# Patient Record
Sex: FEMALE | Race: WHITE | NOT HISPANIC OR LATINO | Employment: OTHER | URBAN - METROPOLITAN AREA
[De-identification: names, ages, dates, MRNs, and addresses within clinical notes are randomized per-mention and may not be internally consistent; named-entity substitution may affect disease eponyms.]

---

## 2017-03-03 ENCOUNTER — RADIANT APPOINTMENT (OUTPATIENT)
Dept: GENERAL RADIOLOGY | Facility: CLINIC | Age: 32
End: 2017-03-03
Attending: FAMILY MEDICINE
Payer: COMMERCIAL

## 2017-03-03 ENCOUNTER — OFFICE VISIT (OUTPATIENT)
Dept: FAMILY MEDICINE | Facility: CLINIC | Age: 32
End: 2017-03-03
Payer: COMMERCIAL

## 2017-03-03 VITALS
HEART RATE: 78 BPM | WEIGHT: 182.25 LBS | OXYGEN SATURATION: 98 % | BODY MASS INDEX: 29.29 KG/M2 | DIASTOLIC BLOOD PRESSURE: 76 MMHG | TEMPERATURE: 98.3 F | HEIGHT: 66 IN | SYSTOLIC BLOOD PRESSURE: 118 MMHG | RESPIRATION RATE: 16 BRPM

## 2017-03-03 DIAGNOSIS — M25.552 HIP PAIN, LEFT: ICD-10-CM

## 2017-03-03 DIAGNOSIS — G57.02 PYRIFORMIS SYNDROME, LEFT: ICD-10-CM

## 2017-03-03 DIAGNOSIS — N91.2 AMENORRHEA: ICD-10-CM

## 2017-03-03 DIAGNOSIS — M25.552 HIP PAIN, LEFT: Primary | ICD-10-CM

## 2017-03-03 LAB — BETA HCG QUAL IFA URINE: NEGATIVE

## 2017-03-03 PROCEDURE — 99213 OFFICE O/P EST LOW 20 MIN: CPT | Performed by: FAMILY MEDICINE

## 2017-03-03 PROCEDURE — 84703 CHORIONIC GONADOTROPIN ASSAY: CPT | Performed by: FAMILY MEDICINE

## 2017-03-03 PROCEDURE — 73502 X-RAY EXAM HIP UNI 2-3 VIEWS: CPT

## 2017-03-03 RX ORDER — PRENATAL VIT/IRON FUM/FOLIC AC 27MG-0.8MG
4 TABLET ORAL DAILY
COMMUNITY
End: 2019-03-19

## 2017-03-03 NOTE — PROGRESS NOTES
SUBJECTIVE:                                                    Tiana Soto is a 31 year old female who presents to clinic today for the following health issues:    Musculoskeletal problem/pain      Duration: x 2 to 3 months    Description  Location: left hip pain points to left buttock area,   No fall or injury, life more sedentary and trying to do preventive stuff, clicking stuff when does exercises    Intensity:  Moderate at its worst, no issues with pain management     Sometime back pain unrelated     Accompanying signs and symptoms: weaker than left  Side doesn't feel correct ; and in the buttocks; at times when patient states up it feels weak and has a throbbing pain . Patient has noticed that hip clicks at times     History   Patient had a job change;work is busy physically but not daily patient has been coming more sedentary; patient started walking to help with pain ( and that seems to help) ; sitting down irritation and even getting up in the morning after sleeping   Previous similar problem: no   Previous evaluation:  Patient has been having massage ; massage therapist told patient to see a PCP and get a referral to a Physical Therapist     Precipitating or alleviating factors:  Trauma or overuse: YES- job change ( see note above)  Aggravating factors include: sitting and laying down    Therapies tried and outcome: massage; stretches     Feels lifestyle changes may be contributing     Has a cold not related to current concern  Planning pregnancy , wants things checked out before gets pregnant as knows body changes with pregnancy too  LMP 12 days ago, feels like might be ovulating  Leaving for trip to [facility] soon.     Problem list and histories reviewed & adjusted, as indicated.  Additional history: as documented    Patient Active Problem List   Diagnosis     Contraceptive, management NEC     Seasonal allergic rhinitis     CARDIOVASCULAR SCREENING; LDL GOAL LESS THAN 160     Nondependent  "alcohol abuse     Anxiety     Skin exam, screening for cancer     Multiple pigmented nevi     Congenital nevus     Past Surgical History   Procedure Laterality Date     No history of surgery         Social History   Substance Use Topics     Smoking status: Former Smoker     Years: 6.00     Types: Cigarettes     Quit date: 7/1/2013     Smokeless tobacco: Never Used      Comment: 1-2 cigarettes a day     Alcohol use Yes      Comment: occasional      Family History   Problem Relation Age of Onset     CANCER No family hx of      skin check         Current Outpatient Prescriptions   Medication Sig Dispense Refill     Prenatal Vit-Fe Fumarate-FA (PRENATAL MULTIVITAMIN  PLUS IRON) 27-0.8 MG TABS per tablet Take 1 tablet by mouth daily       VITAMIN D, CHOLECALCIFEROL, PO Take by mouth daily       No Known Allergies  Recent Labs   Lab Test  05/02/16   1050  12/19/11   0820   LDL  78  56   HDL  113  99   TRIG  42  51      BP Readings from Last 3 Encounters:   03/03/17 118/76   05/02/16 106/67   12/20/13 100/60    Wt Readings from Last 3 Encounters:   03/03/17 182 lb 4 oz (82.7 kg)   05/02/16 186 lb (84.4 kg)   12/20/13 159 lb 12.8 oz (72.5 kg)         Labs reviewed in EPIC    Reviewed and updated as needed this visit by clinical staff       Reviewed and updated as needed this visit by Provider         ROS:  Constitutional, HEENT, cardiovascular, pulmonary, GI, , musculoskeletal, neuro, skin, endocrine and psych systems are negative, except as otherwise noted.    OBJECTIVE:                                                    /76  Pulse 78  Temp 98.3  F (36.8  C) (Tympanic)  Resp 16  Ht 5' 6\" (1.676 m)  Wt 182 lb 4 oz (82.7 kg)  SpO2 98%  BMI 29.42 kg/m2  Body mass index is 29.42 kg/(m^2).  GENERAL: healthy, alert and no distress  EYES: Eyes grossly normal to inspection, PERRL and conjunctivae and sclerae normal  MS: gait normal, full range of motion back, non tender lower back, no reproducible pain , discomfort located " left gluteal area, SLR questionable positive on left. MATILDA test negative, internal and external rotation negative, no hip clicking noted, no gross musculoskeletal defects noted, no edema  SKIN: no suspicious lesions or rashes  NEURO: Normal strength and tone, mentation intact and speech normal  PSYCH: mentation appears normal, affect normal/bright    Diagnostic Test Results:  Results for orders placed or performed in visit on 03/03/17 (from the past 24 hour(s))   Beta HCG qual IFA urine   Result Value Ref Range    Beta HCG Qual IFA Urine Negative NEG        ASSESSMENT/PLAN:                                                      1. Hip pain, left  Former smoker, seasonal allergic rhinitis, multiple pigmented congenital nevi, non dependant alcohol abuse in past , has cut back, with hx of anxiety, nuvaring in past, ParaGard IUD removed at preventive visit 5/2/16 , planning a pregnancy . [place] prescription review negative. Here for left gluteal pain worse with sitting and change in position but improved with walking and staying active. No back symptoms. No red flag signs. HCG neg and xray left hip looks unremarkable. Xray look unremarkable. Suspect pyriformis syndrome. Advised Aleve or motrin twice a day for 5 day with food. Ice than heat to area twice a day. Over the counter pain med's/ creams to area. Stay active. Physical therapy and sports med referral made.   - XR Hip Left 2-3 Views; Future  - [facility] REFERRAL  - ORTHO  REFERRAL    2. Amenorrhea  LMP 2 weeks ago, checked to be sure prior to getting xray,   - Beta HCG qual IFA urine    3. Pyriformis syndrome, left  As above  - XR Hip Left 2-3 Views; Future  - [facility], [facility] CH[facility]RACT[facility] REFERRAL  - ORTHO  REFERRAL    See Patient Instructions  Patient Instructions   Xray look unremarkable  Aleve or motrin twice a day for 5 day with food   Ice than heat   Over the counter pain meds  Stay active  Physical therapy and sports med  referral        Qi Encinas MD  [facility]

## 2017-03-03 NOTE — PROGRESS NOTES
Stephanie Soto,  Your results came back and are within acceptable limits. Normal left hip xrays If you have any further concerns please do not hesitate to contact us by message, phone or making an appointment.  Have a good day   Dr Huang YANG

## 2017-03-03 NOTE — MR AVS SNAPSHOT
After Visit Summary   3/3/2017    Tiana Soto    MRN: [id number]           Patient Information     Date Of Birth          1985        Visit Information        Provider Department      3/3/2017 9:40 AM Qi Encinas MD [facility]        Today's Diagnoses     Hip pain, left    -  1    Amenorrhea        Pyriformis syndrome, left          Care Instructions    Xray look unremarkable  Aleve or motrin twice a day for 5 day with food   Ice than heat   Over the counter pain meds  Stay active  Physical therapy and sports med referral          Follow-ups after your visit        Additional Services     [facility], [facility] CH[facility]RACTIC REFERRAL       **This order will print in the [facility] Scheduling Office**    Physical Therapy, Hand Therapy and Chiropractic Care are available through:    *[facility]  *[facility]  *[facility]    Call one number to schedule at any of the above locations: [phone].    Your provider has referred you to: Physical Therapy at [facility] or [facility]    Indication/Reason for Referral: Hip Pain  Onset of Illness: ?  Therapy Orders: Evaluate and Treat  Special Programs: None  Special Request: None    Azul Liang      Additional Comments for the Therapist or Chiropractor:     Please be aware that coverage of these services is subject to the terms and limitations of your health insurance plan.  Call member services at your health plan with any benefit or coverage questions.      Please bring the following to your appointment:    *Your personal calendar for scheduling future appointments  *Comfortable clothing            ORTHO  REFERRAL       [facility] is referring you to the Orthopedic  Services at [facility].       The  Representative will assist you in the coordination of your Orthopedic and Musculoskeletal Care as prescribed by your physician.    The   Representative will call you within 24 hours to help schedule your appointment, or you may contact the [facility] Representative at:    [facility] ~ [phone]  [facility] ~ [phone]  [facility] ~ [phone]    Type of Referral : Non Surgical       Timeframe requested: 3 - 5 days     Coverage of these services is subject to the terms and limitations of your health insurance plan.  Please call member services at your health plan with any benefit or coverage questions.      If X-rays, CT or MRI's have been performed, please contact the facility where they were done to arrange for , prior to your scheduled appointment.  Please bring this referral request to your appointment and present it to your specialist.                  Who to contact     If you have questions or need follow up information about today's clinic visit or your schedule please contact [facility] directly at [phone].  Normal or non-critical lab and imaging results will be communicated to you by W[employer], letter or phone within 4 business days after the clinic has received the results. If you do not hear from us within 7 days, please contact the clinic through [employer] or phone. If you have a critical or abnormal lab result, we will notify you by phone as soon as possible.  Submit refill requests through [employer] or call your pharmacy and they will forward the refill request to us. Please allow 3 business days for your refill to be completed.          Additional Information About Your Visit        [employer] Information     [employer] gives you secure access to your electronic health record. If you see a primary care provider, you can also send messages to your care team and make appointments. If you have questions, please call your primary care clinic.  If you do not have a primary care provider, please call [phone] and they will assist you.        Care EveryWhere ID     This is  "your Care EveryWhere ID. This could be used by other organizations to access your [facility] medical records  [id number]        Your Vitals Were     Pulse Temperature Respirations Height Pulse Oximetry BMI (Body Mass Index)    78 98.3  F (36.8  C) (Tympanic) 16 5' 6\" (1.676 m) 98% 29.42 kg/m2       Blood Pressure from Last 3 Encounters:   03/03/17 118/76   05/02/16 106/67   12/20/13 100/60    Weight from Last 3 Encounters:   03/03/17 182 lb 4 oz (82.7 kg)   05/02/16 186 lb (84.4 kg)   12/20/13 159 lb 12.8 oz (72.5 kg)              We Performed the Following     Beta HCG qual IFA urine     [facility], [facility] CH[facility]RACTIC REFERRAL     ORTHO  REFERRAL        Primary Care Provider Office Phone # Fax #    Maura Benavides [phone]       [address] [address]        Thank you!     Thank you for choosing [facility]  for your care. Our goal is always to provide you with excellent care. Hearing back from our patients is one way we can continue to improve our services. Please take a few minutes to complete the written survey that you may receive in the mail after your visit with us. Thank you!             Your Updated Medication List - Protect others around you: Learn how to safely use, store and throw away your medicines at www.disposemymeds.org.          This list is accurate as of: 3/3/17 11:07 AM.  Always use your most recent med list.                   Brand Name Dispense Instructions for use    prenatal multivitamin  plus iron 27-0.8 MG Tabs per tablet      Take 1 tablet by mouth daily       VITAMIN D (CHOLECALCIFEROL) PO      Take by mouth daily         "

## 2017-03-03 NOTE — PATIENT INSTRUCTIONS
Xray look unremarkable  Aleve or motrin twice a day for 5 day with food   Ice than heat   Over the counter pain meds  Stay active  Physical therapy and sports med referral

## 2017-03-29 ENCOUNTER — OFFICE VISIT (OUTPATIENT)
Dept: ORTHOPEDICS | Facility: CLINIC | Age: 32
End: 2017-03-29
Payer: COMMERCIAL

## 2017-03-29 VITALS
DIASTOLIC BLOOD PRESSURE: 72 MMHG | HEIGHT: 66 IN | WEIGHT: 182 LBS | SYSTOLIC BLOOD PRESSURE: 112 MMHG | BODY MASS INDEX: 29.25 KG/M2

## 2017-03-29 DIAGNOSIS — M54.42 ACUTE LEFT-SIDED LOW BACK PAIN WITH LEFT-SIDED SCIATICA: Primary | ICD-10-CM

## 2017-03-29 DIAGNOSIS — M76.32 ILIOTIBIAL BAND SYNDROME, LEFT: ICD-10-CM

## 2017-03-29 PROCEDURE — 99243 OFF/OP CNSLTJ NEW/EST LOW 30: CPT | Performed by: FAMILY MEDICINE

## 2017-03-29 NOTE — PROGRESS NOTES
ASSESSMENT & PLAN    ICD-10-CM    1. Acute left-sided low back pain with left-sided sciatica M54.42 [facility] REFERRAL   2. Iliotibial band syndrome, left M76.32 [facility] CH[facility] REFERRAL   Discussed her exam which appears more discogenic/radicular in nature.  Also has pain associated with ITB /gluteus and obturator.  No red flags on exam to warrant MRI at this time.  Hip x-rays were reviewed which shows a well-preserved joint space and her exam does not suggest an intra-articular process.  Discussed the utility of a Medrol Dosepak however she declines  Agree with physical therapy and an order was written specifically to address the discogenic/radicular pain  Were also to indicate directly with Brandon    Follow up after 2-3 visits of therapy as needed or sooner if needed. Call direct clinic number [[phone]] at any time with questions or concerns.Instructed to call the office if the condition evolves or worsens.    -----    SUBJECTIVE  Tiana Soto is a/an 31 year old female who is seen in consultation at the request of Dr. Encinas for evaluation of left posterolateral hip pain. The patient is seen by themselves.    Onset: 2-3 month(s) ago. Reports insidious onset without acute precipitating event.  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    REVIEW OF SYSTEMS:  10 point ROS is negative other than symptoms noted above in HPI, Past Medical History or as stated  "below  Constitutional: NEGATIVE for fever, chills, change in weight  Skin: NEGATIVE for worrisome rashes, moles or lesions  GI/: NEGATIVE for bowel or bladder changes  Neuro: NEGATIVE for weakness, dizziness or paresthesias    OBJECTIVE:  Ht 5' 6\" (1.676 m)  Wt 182 lb (82.6 kg)  BMI 29.38 kg/m2   General: healthy, alert and in no distress  HEENT: no scleral icterus or conjunctival erythema  Skin: no suspicious lesions or rash. No jaundice.  CV: no pedal edema  Resp: normal respiratory effort without conversational dyspnea   Psych: normal mood and affect  Gait: normal steady gait with appropriate coordination and balance  Neuro: Motor strength as noted below, DTR's 2+ patella and achilles bilaterally. Normal light sensory exam of bilateral lower extremities.  MSK:  LEFT HIP  Inspection:    No obvious deformity or asymmetry, level pelvis  Palpation:    Tender about the gluteus medius insertion, ITB and deep to posterior greater tub facet. Otherwise all other landmarks are nontender.  Active Range of Motion:     Flexion full, IR full, ER  full  Strength:    Flexion 5/5, adduction 5/5, abduction 5/5  Special Tests:    Positive: resisted gluteus medius provocation, Courtney's    Negative: Logroll, anterior impingement (FADIR)    THORACIC/LUMBAR SPINE  Inspection:    Level pelvis  Palpation:    Tender about the left SI joint and left sciatic notch. Otherwise remainder of landmarks are nontender.  Range of Motion:     Lumbar flexion limited by pain    Lumbar extension with no change in pain  Strength:    able to heel walk but reproduces pain in the buttock/down the posterior left leg, able to toe walk, quadriceps 5/5, hamstrings 5/5, gastrocsoleus 5/5, tibialis anterior 5/5, extensor hallicus longus 5/5  Special Tests:    Positive: slump test (bilateral) but radicular pain is purely on the left (posterior thigh/calf)    Independent visualization of the below image:  XR HIP LEFT 2-3 VIEWS 3/3/2017 11:06 AM     HISTORY: Left " hip pain.     COMPARISON: None.     FINDINGS: Left hip joint space is preserved. No significant osseous  degenerative change. No fracture or malalignment.         IMPRESSION: No osseous abnormality to explain pain.     TOMAS GIPSON MD    Patient's conditions were thoroughly discussed during today's visit with greater than 50% of the visit spent counseling the patient with total time spent face-to-face with the patient being 20 minutes.    Sammie Woodward DO [facility]

## 2017-03-29 NOTE — NURSING NOTE
"Chief Complaint   Patient presents with     Musculoskeletal Problem       Initial /72  Ht 5' 6\" (1.676 m)  Wt 182 lb (82.6 kg)  BMI 29.38 kg/m2 Estimated body mass index is 29.38 kg/(m^2) as calculated from the following:    Height as of this encounter: 5' 6\" (1.676 m).    Weight as of this encounter: 182 lb (82.6 kg).  Medication Reconciliation: complete     Wong Abernathy ATC    "

## 2017-03-29 NOTE — Clinical Note
Tiana Ravi Dr. saw me at [facility] on Mar 29, 2017.  Please refer to the visit note at your convenience and feel free to contact me should you have any questions.  Thank you for the consult. Sincerely,  Sammie Woodward DO, [facility]

## 2017-03-29 NOTE — PATIENT INSTRUCTIONS
Thank you for allowing us to participate in your care today.  Please find below your visit diagnosis and the plan going forward.    1. Acute left-sided low back pain with left-sided sciatica    2. Iliotibial band syndrome, left      Discussed your exam and it appears more related to your back  Reviewed xrays which shows no arthritis in your hip  Activity modification as discussed  Initiate Physical therapy: [facility] - [phone]  Will send message to Brandon in regards to addressing your back    Follow up after 2-3 visits of therapy as needed or sooner if needed. Call direct clinic number [[phone]] at any time with questions or concerns.    Sammie Woodward DO CAQSM  [facility]  Website: [email or web address]  Twitter: [email or web address]

## 2017-03-29 NOTE — MR AVS SNAPSHOT
After Visit Summary   3/29/2017    Tiana Soto    MRN: [id number]           Patient Information     Date Of Birth          1985        Visit Information        Provider Department      3/29/2017 10:00 AM Sammie Woodward DO [facility] SP[facility]TS [facility]IC[facility]        Today's Diagnoses     Acute left-sided low back pain with left-sided sciatica    -  1    Iliotibial band syndrome, left          Care Instructions    Thank you for allowing us to participate in your care today.  Please find below your visit diagnosis and the plan going forward.    1. Acute left-sided low back pain with left-sided sciatica    2. Iliotibial band syndrome, left      Discussed your exam and it appears more related to your back  Reviewed xrays which shows no arthritis in your hip  Activity modification as discussed  Initiate Physical therapy: [facility] - [phone]  Will send message to Brandon in regards to addressing your back    Follow up after 2-3 visits of therapy as needed or sooner if needed. Call direct clinic number [[phone]] at any time with questions or concerns.    Sammie Woodward DO [facility]  Website: [email or web address]  Twitter: [email or web address]          Follow-ups after your visit        Additional Services     BASSEM PT, HAND, AND CHIROPRACTIC REFERRAL       **This order will print in the [facility] Scheduling Office**    Physical Therapy, Hand Therapy and Chiropractic Care are available through:    *[facility]  *[facility]  *[facility]    Call one number to schedule at any of the above locations: [phone].    Your provider has referred you to: Physical Therapy at [facility] or [facility]    Indication/Reason for Referral: Low Back Pain - L lumbar radic, S1 pattern. Previous PT order from PCP was for hip/piriformis.  Onset of Illness: see chart  Therapy Orders: Evaluate and Treat  Special Programs:  None  Special Request: MDT PT please    Azul Liang      Additional Comments for the Therapist or Chiropractor:     Please be aware that coverage of these services is subject to the terms and limitations of your health insurance plan.  Call member services at your health plan with any benefit or coverage questions.      Please bring the following to your appointment:    *Your personal calendar for scheduling future appointments  *Comfortable clothing                  Your next 10 appointments already scheduled     Mar 31, 2017  8:50 AM CDT   ABSSEM Extremity with Brandon Man PT   [facility] ([facility]  )    [address] [place] [address]   [phone]            Apr 07, 2017  8:50 AM CDT   BASSEM Extremity with Brandon Man PT   [facility] ([facility]  )    [address] [place] [address]   [phone]            Apr 14, 2017  8:50 AM CDT   BASSEM Extremity with Brandon Man PT   [facility] ([facility]  )    [address] [place] [address]   [phone]              Who to contact     If you have questions or need follow up information about today's clinic visit or your schedule please contact [facility] SP[facility]TS MEDICINE directly at [phone].  Normal or non-critical lab and imaging results will be communicated to you by MyChart, letter or phone within 4 business days after the clinic has received the results. If you do not hear from us within 7 days, please contact the clinic through MyChart or phone. If you have a critical or abnormal lab result, we will notify you by phone as soon as possible.  Submit refill requests through [employer] or call your pharmacy and they will forward the refill request to us. Please allow 3 business days for your refill to be completed.          Additional Information  "About Your Visit        [email or web address] Information     [employer] [facility] gives you secure access to your electronic health record. If you see a primary care provider, you can also send messages to your care team and make appointments. If you have questions, please call your primary care clinic.  If you do not have a primary care provider, please call [phone] and they will assist you.        Care EveryWhere ID     This is your Care EveryWhere ID. This could be used by other organizations to access your [place] medical records  [id number]        Your Vitals Were     Height BMI (Body Mass Index)                5' 6\" (1.676 m) 29.38 kg/m2           Blood Pressure from Last 3 Encounters:   03/29/17 112/72   03/03/17 118/76   05/02/16 106/67    Weight from Last 3 Encounters:   03/29/17 182 lb (82.6 kg)   03/03/17 182 lb 4 oz (82.7 kg)   05/02/16 186 lb (84.4 kg)              We Performed the Following     BASSEM PT, HAND, AND CHIROPRACTIC REFERRAL        Primary Care Provider Office Phone # Fax #    Maura Benavides [phone]       [address] [address]        Thank you!     Thank you for choosing [facility]  for your care. Our goal is always to provide you with excellent care. Hearing back from our patients is one way we can continue to improve our services. Please take a few minutes to complete the written survey that you may receive in the mail after your visit with us. Thank you!             Your Updated Medication List - Protect others around you: Learn how to safely use, store and throw away your medicines at www.disposemymeds.org.          This list is accurate as of: 3/29/17 11:04 AM.  Always use your most recent med list.                   Brand Name Dispense Instructions for use    IBUPROFEN PO          prenatal multivitamin  plus iron 27-0.8 MG Tabs per tablet      Take 1 tablet by mouth daily       VITAMIN D (CHOLECALCIFEROL) PO      Take by mouth " daily

## 2017-03-31 ENCOUNTER — THERAPY VISIT (OUTPATIENT)
Dept: PHYSICAL THERAPY | Facility: CLINIC | Age: 32
End: 2017-03-31
Payer: COMMERCIAL

## 2017-03-31 DIAGNOSIS — M54.42 LEFT-SIDED LOW BACK PAIN WITH LEFT-SIDED SCIATICA: Primary | ICD-10-CM

## 2017-03-31 PROCEDURE — 97110 THERAPEUTIC EXERCISES: CPT | Mod: GP | Performed by: PHYSICAL THERAPIST

## 2017-03-31 PROCEDURE — 97161 PT EVAL LOW COMPLEX 20 MIN: CPT | Mod: GP | Performed by: PHYSICAL THERAPIST

## 2017-03-31 ASSESSMENT — ACTIVITIES OF DAILY LIVING (ADL)
TWISTING/PIVOTING_ON_INVOLVED_LEG: SLIGHT DIFFICULTY
HOS_ADL_HIGHEST_POTENTIAL_SCORE: 68
HEAVY_WORK: SLIGHT DIFFICULTY
ROLLING_OVER_IN_BED: MODERATE DIFFICULTY
SITTING_FOR_15_MINUTES: SLIGHT DIFFICULTY
HOS_ADL_SCORE(%): 82.35
GETTING_INTO_AND_OUT_OF_A_BATHTUB: SLIGHT DIFFICULTY
WALKING_DOWN_STEEP_HILLS: NO DIFFICULTY AT ALL
HOS_ADL_COUNT: 17
STEPPING_UP_AND_DOWN_CURBS: MODERATE DIFFICULTY
GETTING_INTO_AND_OUT_OF_AN_AVERAGE_CAR: MODERATE DIFFICULTY
RECREATIONAL_ACTIVITIES: NO DIFFICULTY AT ALL
WALKING_APPROXIMATELY_10_MINUTES: NO DIFFICULTY AT ALL
GOING_DOWN_1_FLIGHT_OF_STAIRS: NO DIFFICULTY AT ALL
WALKING_15_MINUTES_OR_GREATER: NO DIFFICULTY AT ALL
STANDING_FOR_15_MINUTES: NO DIFFICULTY AT ALL
WALKING_INITIALLY: NO DIFFICULTY AT ALL
PUTTING_ON_SOCKS_AND_SHOES: MODERATE DIFFICULTY
HOS_ADL_ITEM_SCORE_TOTAL: 56
DEEP_SQUATTING: MODERATE DIFFICULTY
HOW_WOULD_YOU_RATE_YOUR_CURRENT_LEVEL_OF_FUNCTION_DURING_YOUR_USUAL_ACTIVITIES_OF_DAILY_LIVING_FROM_0_TO_100_WITH_100_BEING_YOUR_LEVEL_OF_FUNCTION_PRIOR_TO_YOUR_HIP_PROBLEM_AND_0_BEING_THE_INABILITY_TO_PERFORM_ANY_OF_YOUR_USUAL_DAILY_ACTIVITIES?: 75
GOING_UP_1_FLIGHT_OF_STAIRS: NO DIFFICULTY AT ALL
WALKING_UP_STEEP_HILLS: NO DIFFICULTY AT ALL
LIGHT_TO_MODERATE_WORK: SLIGHT DIFFICULTY

## 2017-03-31 NOTE — MR AVS SNAPSHOT
After Visit Summary   3/31/2017    Tiana Soto    MRN: [id number]           Patient Information     Date Of Birth          1985        Visit Information        Provider Department      3/31/2017 8:50 AM Brandon Man PT [facility]        Today's Diagnoses     Left-sided low back pain with left-sided sciatica    -  1       Follow-ups after your visit        Your next 10 appointments already scheduled     Apr 07, 2017  8:50 AM CDT   BASSEM Extremity with Brandon Man PT   [facility] ([facility]  )    [address] [place] [address]   [phone]            Apr 14, 2017  8:50 AM CDT   BASSEM Extremity with Brandon Man PT   [facility] ([facility]  )    [address] [place] [address] [phone]              Who to contact     If you have questions or need follow up information about today's clinic visit or your schedule please contact [facility] ATH[facility]IC [facility] PHYSICAL THERAP[facility] directly at [phone].  Normal or non-critical lab and imaging results will be communicated to you by [employer], letter or phone within 4 business days after the clinic has received the results. If you do not hear from us within 7 days, please contact the clinic through [employer] or phone. If you have a critical or abnormal lab result, we will notify you by phone as soon as possible.  Submit refill requests through [employer] or call your pharmacy and they will forward the refill request to us. Please allow 3 business days for your refill to be completed.          Additional Information About Your Visit        MyChart Information     [employer] gives you secure access to your electronic health record. If you see a primary care provider, you can also send messages to your care team and make appointments. If you have  questions, please call your primary care clinic.  If you do not have a primary care provider, please call [phone] and they will assist you.        Care EveryWhere ID     This is your Care EveryWhere ID. This could be used by other organizations to access your [place] medical records  [id number]         Blood Pressure from Last 3 Encounters:   03/29/17 112/72   03/03/17 118/76   05/02/16 106/67    Weight from Last 3 Encounters:   03/29/17 82.6 kg (182 lb)   03/03/17 82.7 kg (182 lb 4 oz)   05/02/16 84.4 kg (186 lb)              We Performed the Following     HC PT EVAL, LOW COMPLEXITY     BASSEM INITIAL EVAL REPORT     THERAPEUTIC EXERCISES        Primary Care Provider Office Phone # Fax #    Maura Benavides [phone]       [facility] [address] [facility] [address]        Thank you!     Thank you for choosing [facility]  for your care. Our goal is always to provide you with excellent care. Hearing back from our patients is one way we can continue to improve our services. Please take a few minutes to complete the written survey that you may receive in the mail after your visit with us. Thank you!             Your Updated Medication List - Protect others around you: Learn how to safely use, store and throw away your medicines at www.disposemymeds.org.          This list is accurate as of: 3/31/17 11:59 PM.  Always use your most recent med list.                   Brand Name Dispense Instructions for use    IBUPROFEN PO          prenatal multivitamin  plus iron 27-0.8 MG Tabs per tablet      Take 1 tablet by mouth daily       VITAMIN D (CHOLECALCIFEROL) PO      Take by mouth daily

## 2017-03-31 NOTE — PROGRESS NOTES
[facility] Initial Evaluation    Subjective:  Pt presents to PT with a chief complaint of L posterior-lateral hip pain with radiating numbness/tingling into L posterior thigh and calf with reported insidious onset ~3 months ago with no acute precipitating event. Pt does report potential correlation to repetitive forward bending/lifting at work. Pain reported to be worse  prolonged sitting, sit<->stand transition, forward bending, lifting, and early in the morning,. Pain improved with walking and ibuprofen, but patient attempts to avoid medications if possible.     Tiana Soto is a 31 year old female with a lumbar (L gluteal) condition.  Condition occurred with:  Insidious onset.  Condition occurred: for unknown reasons.  This is a new condition  ~3 months ago  .    Patient reports pain:  Lower lumbar spine and lumbar spine left.  Radiates to:  Gluteals left.  Pain is described as aching and is intermittent and reported as 8/10 (4/10 currently).  Associated symptoms:  Numbness, tingling and loss of motion/stiffness. Pain is worse in the A.M..  Symptoms are exacerbated by lifting, twisting and bending and relieved by NSAID's and activity/movement (standing).  Since onset symptoms are gradually worsening.  Special tests:  X-ray.      General health as reported by patient is good.  Pertinent medical history includes:  Smoking, tuberculosis and overweight.  Medical allergies: no.  Other surgeries include:  No.  Current medications:  None as reported by the patient.  Current occupation is Manufacturing  .  Patient is working in normal job without restrictions.  Primary job tasks include:  Prolonged standing, lifting and repetitive tasks.    Barriers include:  None as reported by the patient.    Red flags:  None as reported by the patient.                        Objective:    Standing Alignment:        Lumbar:  Lordosis decr (fair slumped sitting posture)                           Lumbar/SI  Evaluation  ROM:    AROM Lumbar:   Flexion:          Min loss, pain inc w/ peripheralization into posterior thigh L  Ext:                    Min loss, pain inc central   Side Bend:        Left:     Right:   Rotation:           Left:  Min loss    Right:  Min loss  Side Glide:        Left:     Right:           Lumbar Myotomes:  normal            Lumbar DTR's:  normal      Cord Signs:  normal    Lumbar Dermtomes:  normal                Neural Tension/Mobility:    Left side:  SLR w/DF and Slump positive.         Lumbar Provocation:  Lumbar provocation: Gluteal pain reproduced with end range hip flexion/ER.                                          Hip Evaluation    Hip Strength:      Extension:  Left: 4+/5  Pain:Right: 4+/5    Pain:    Abduction:  Left: 4/5     Pain:Right: 4/5    Pain:                                 General     ROS    Assessment/Plan:      Patient is a 31 year old female with lumbar complaints.    Patient has the following significant findings with corresponding treatment plan.                Diagnosis 1:  L sided lumbar radiculopathy    Pain -  manual therapy, splint/taping/bracing/orthotics and directional preference exercise  Decreased ROM/flexibility - manual therapy and therapeutic exercise  Decreased strength - therapeutic exercise and therapeutic activities  Impaired posture - neuro re-education    Therapy Evaluation Codes:   1) History comprised of:   Personal factors that impact the plan of care:      None.    Comorbidity factors that impact the plan of care are:      None.     Medications impacting care: None.  2) Examination of Body Systems comprised of:   Body structures and functions that impact the plan of care:      Lumbar spine.   Activity limitations that impact the plan of care are:      Bending, Driving, Lifting, Reading/Computer work and Sitting.  3) Clinical presentation characteristics are:   Stable/Uncomplicated.  4) Decision-Making    Low complexity using standardized patient  assessment instrument and/or measureable assessment of functional outcome.  Cumulative Therapy Evaluation is: Low complexity.    Previous and current functional limitations:  (See Goal Flow Sheet for this information)    Short term and Long term goals: (See Goal Flow Sheet for this information)     Communication ability:  Patient appears to be able to clearly communicate and understand verbal and written communication and follow directions correctly.  Treatment Explanation - The following has been discussed with the patient:   RX ordered/plan of care  Anticipated outcomes  Possible risks and side effects  This patient would benefit from PT intervention to resume normal activities.   Rehab potential is good.    Frequency:  1 X week, once daily  Duration:  for 6 weeks  Discharge Plan:  Achieve all LTG.  Independent in home treatment program.  Reach maximal therapeutic benefit.    Please refer to the daily flowsheet for treatment today, total treatment time and time spent performing 1:1 timed codes.

## 2017-04-03 PROBLEM — M54.42 LEFT-SIDED LOW BACK PAIN WITH LEFT-SIDED SCIATICA: Status: ACTIVE | Noted: 2017-04-03

## 2017-04-07 ENCOUNTER — THERAPY VISIT (OUTPATIENT)
Dept: PHYSICAL THERAPY | Facility: CLINIC | Age: 32
End: 2017-04-07
Payer: COMMERCIAL

## 2017-04-07 DIAGNOSIS — M54.42 LEFT-SIDED LOW BACK PAIN WITH LEFT-SIDED SCIATICA: ICD-10-CM

## 2017-04-07 PROCEDURE — 97110 THERAPEUTIC EXERCISES: CPT | Mod: GP | Performed by: PHYSICAL THERAPIST

## 2017-04-07 PROCEDURE — 97112 NEUROMUSCULAR REEDUCATION: CPT | Mod: GP | Performed by: PHYSICAL THERAPIST

## 2017-04-14 ENCOUNTER — THERAPY VISIT (OUTPATIENT)
Dept: PHYSICAL THERAPY | Facility: CLINIC | Age: 32
End: 2017-04-14
Payer: COMMERCIAL

## 2017-04-14 DIAGNOSIS — M54.42 LEFT-SIDED LOW BACK PAIN WITH LEFT-SIDED SCIATICA: ICD-10-CM

## 2017-04-14 PROCEDURE — 97110 THERAPEUTIC EXERCISES: CPT | Mod: GP | Performed by: PHYSICAL THERAPIST

## 2017-04-14 PROCEDURE — 97112 NEUROMUSCULAR REEDUCATION: CPT | Mod: GP | Performed by: PHYSICAL THERAPIST

## 2017-04-21 ENCOUNTER — THERAPY VISIT (OUTPATIENT)
Dept: PHYSICAL THERAPY | Facility: CLINIC | Age: 32
End: 2017-04-21
Payer: COMMERCIAL

## 2017-04-21 DIAGNOSIS — M54.42 LEFT-SIDED LOW BACK PAIN WITH LEFT-SIDED SCIATICA: ICD-10-CM

## 2017-04-21 PROCEDURE — 97110 THERAPEUTIC EXERCISES: CPT | Mod: GP | Performed by: PHYSICAL THERAPIST

## 2017-04-21 PROCEDURE — 97112 NEUROMUSCULAR REEDUCATION: CPT | Mod: GP | Performed by: PHYSICAL THERAPIST

## 2017-04-21 NOTE — PROGRESS NOTES
Subjective:    HPI                    Objective:    System    Physical Exam    General     ROS    Assessment/Plan:      PROGRESS  REPORT    Progress reporting period is from 3/31/17 to 4/21/17. Pt has attended 4 PT sessions addressing patient's chief complaint of L posterior-lateral hip pain with radiating numbness/tingling into L posterior thigh and calf with reported insidious onset ~3 months ago with no acute precipitating event. Pt has made significant progress throughout PT with a primary treatment emphasis on an extension based directional preference program. Pt currently painfree and denies any L LE radicular sxs at rest. Examination reveals asymmetry in neural tension remains with positive Slump test, and patient would benefit from continued PT. Pt would like to continue with HEP independently with plans to f/u as needed.     SUBJECTIVE  Subjective: Pt reports significant improvement in L LE radicular sxs and gluteal pain. Pt very pleased with her progress and has been able to progress to walking ~3 miles consistently w/o pain. Pt reports continued adherence to HEP, and feels that extension exercises have been helpful. Feels confident in continued management with HEP    Current Pain level: 0/10.     Initial Pain level: 4/10.   Changes in function:  Yes (See Goal flowsheet attached for changes in current functional level)  Adverse reaction to treatment or activity: None    OBJECTIVE  Changes noted in objective findings:  Yes,   Lumbar AROM: Flexion, min loss, painfree; Extension: WNL, painfree: Rotation limited minimally bilaterally but painfree.   Increased neural tension remains on the L LE compared to R LE with slump testing remaining positive.   PT recommends patient continued extension based program with plans to f/u as needed      ASSESSMENT/PLAN  Updated problem list and treatment plan: Diagnosis 1:  L sided LBP w/ radiation into L LE   Pain -  hot/cold therapy, self management, education and  directional preference exercise  Decreased ROM/flexibility - manual therapy and therapeutic exercise  Impaired muscle performance - neuro re-education  Impaired posture - neuro re-education  STG/LTGs have been met or progress has been made towards goals:  Yes (See Goal flow sheet completed today.)  Assessment of Progress: The patient's condition is improving.  Self Management Plans:  Patient has been instructed in a home treatment program.  Patient is independent in a home treatment program.  I have re-evaluated this patient and find that the nature, scope, duration and intensity of the therapy is appropriate for the medical condition of the patient.  Tiana continues to require the following intervention to meet STG and LTG's:  PT    Recommendations:  Pt would benefit from continued PT services but has elected to continue with HEP with plans to f/u as needed    Please refer to the daily flowsheet for treatment today, total treatment time and time spent performing 1:1 timed codes.

## 2017-04-21 NOTE — MR AVS SNAPSHOT
After Visit Summary   4/21/2017    Tiana Soto    MRN: [id number]           Patient Information     Date Of Birth          1985        Visit Information        Provider Department      4/21/2017 8:50 AM Brandon Man PT [facility] Physical Therapy        Today's Diagnoses     Left-sided low back pain with left-sided sciatica           Follow-ups after your visit        Who to contact     If you have questions or need follow up information about today's clinic visit or your schedule please contact [facility] ATHLETIC [facility] PHYSICAL [facility] directly at [phone].  Normal or non-critical lab and imaging results will be communicated to you by [employer], letter or phone within 4 business days after the clinic has received the results. If you do not hear from us within 7 days, please contact the clinic through [employer] or phone. If you have a critical or abnormal lab result, we will notify you by phone as soon as possible.  Submit refill requests through [employer] or call your pharmacy and they will forward the refill request to us. Please allow 3 business days for your refill to be completed.          Additional Information About Your Visit        MyChart Information     [employer] gives you secure access to your electronic health record. If you see a primary care provider, you can also send messages to your care team and make appointments. If you have questions, please call your primary care clinic.  If you do not have a primary care provider, please call [phone] and they will assist you.        Care EveryWhere ID     This is your Care EveryWhere ID. This could be used by other organizations to access your [facility] medical records  [id number]         Blood Pressure from Last 3 Encounters:   03/29/17 112/72   03/03/17 118/76   05/02/16 106/67    Weight from Last 3 Encounters:   03/29/17 82.6 kg (182 lb)   03/03/17 82.7 kg (182 lb 4 oz)    05/02/16 84.4 kg (186 lb)              We Performed the Following     NEUROMUSCULAR RE-EDUCATION     THERAPEUTIC EXERCISES        Primary Care Provider Office Phone # Fax #    Maura Benavides [phone]       [facility] [address]  [facility] [address]        Thank you!     Thank you for choosing [facility] PHYSICAL THERAPY  for your care. Our goal is always to provide you with excellent care. Hearing back from our patients is one way we can continue to improve our services. Please take a few minutes to complete the written survey that you may receive in the mail after your visit with us. Thank you!             Your Updated Medication List - Protect others around you: Learn how to safely use, store and throw away your medicines at www.disposemymeds.org.          This list is accurate as of: 4/21/17 12:04 PM.  Always use your most recent med list.                   Brand Name Dispense Instructions for use    IBUPROFEN PO          prenatal multivitamin  plus iron 27-0.8 MG Tabs per tablet      Take 1 tablet by mouth daily       VITAMIN D (CHOLECALCIFEROL) PO      Take by mouth daily

## 2017-11-14 ENCOUNTER — OFFICE VISIT (OUTPATIENT)
Dept: FAMILY MEDICINE | Facility: CLINIC | Age: 32
End: 2017-11-14
Payer: COMMERCIAL

## 2017-11-14 VITALS
HEIGHT: 66 IN | DIASTOLIC BLOOD PRESSURE: 76 MMHG | BODY MASS INDEX: 27.88 KG/M2 | SYSTOLIC BLOOD PRESSURE: 115 MMHG | OXYGEN SATURATION: 100 % | RESPIRATION RATE: 12 BRPM | HEART RATE: 65 BPM | TEMPERATURE: 98 F | WEIGHT: 173.5 LBS

## 2017-11-14 DIAGNOSIS — F48.9 MOOD PROBLEM: ICD-10-CM

## 2017-11-14 DIAGNOSIS — F10.10 NONDEPENDENT ALCOHOL ABUSE: ICD-10-CM

## 2017-11-14 DIAGNOSIS — Z23 NEED FOR PROPHYLACTIC VACCINATION AND INOCULATION AGAINST INFLUENZA: ICD-10-CM

## 2017-11-14 DIAGNOSIS — F41.9 ANXIETY: ICD-10-CM

## 2017-11-14 DIAGNOSIS — N92.6 IRREGULAR PERIODS: ICD-10-CM

## 2017-11-14 DIAGNOSIS — Z00.00 ROUTINE GENERAL MEDICAL EXAMINATION AT A HEALTH CARE FACILITY: Primary | ICD-10-CM

## 2017-11-14 LAB — BETA HCG QUAL IFA URINE: NEGATIVE

## 2017-11-14 PROCEDURE — 84703 CHORIONIC GONADOTROPIN ASSAY: CPT | Performed by: FAMILY MEDICINE

## 2017-11-14 PROCEDURE — 90686 IIV4 VACC NO PRSV 0.5 ML IM: CPT | Performed by: FAMILY MEDICINE

## 2017-11-14 PROCEDURE — 90471 IMMUNIZATION ADMIN: CPT | Performed by: FAMILY MEDICINE

## 2017-11-14 PROCEDURE — 99395 PREV VISIT EST AGE 18-39: CPT | Mod: 25 | Performed by: FAMILY MEDICINE

## 2017-11-14 NOTE — NURSING NOTE
Per orders of Dr. Benavides, injection of flu shot  given by KADIE ECHOLS Patient instructed to remain in clinic for 15 minutes afterwards, and to report any adverse reaction to me immediately.

## 2017-11-14 NOTE — PROGRESS NOTES
"   SUBJECTIVE:   CC: Tiana Soto is an 32 year old woman who presents for preventive health visit.     Physical   Annual:     Getting at least 3 servings of Calcium per day::  Yes    Bi-annual eye exam::  NO    Dental care twice a year::  Yes    Sleep apnea or symptoms of sleep apnea::  None    Diet::  Other    Frequency of exercise::  4-5 days/week    Duration of exercise::  30-45 minutes    Taking medications regularly::  Not Applicable    Answers for HPI/ROS submitted by the patient on 11/14/2017   PHQ-2 Score: 0      Manic episode - Two weeks ago she had a weird experience. On 10/24 she finished a 10 day anti inflammatory cleanse (vegetables, lean protein, no alcohol or caffeine). Then on 10/29 she had a weird  experience that lasted until 11/7.  She experienced an episode where her friends said she was \"too happy\", and should mention this to her doctor. The episode was positive, not out of reality, and she felt optimistic about what is happening in her life. She had a lot of energy and did not feel hungry, sometimes having to force herself to eat. She was not sleeping as much at night (4-6 hours instead of 8-10) because she felt so energized. Sleep and eating habits have returned to normal. During that period of time she had weight loss. She did not make any impulsive decisions or purchases during that time and was working on work documents most of the time. In the past she has had depression. Denies family history of bipolar disorder. Most of her family struggles with depression. Her mood is in a better place since having this episode. She now feels that she has a glass half full mentality, that is true to her soul, and she feels empowered. Her friends have said she appears to be back to her normal self. She denies hallucinations. She denies previous episodes like this. She was not taking any OTC medication or supplements. She does have some variable sleep schedule related to her work and this was the case " prior to the episode, but not different from normal.     Pregnancy - She is not actively trying to get pregnant but is not using protection and would be happy if she were. Wonders if the changes above could be related to pregnancy. Had her period, though just one day of bleeding, two weeks ago. Would like pregnancy test today    Alcohol use - Alcohol reduction has been at the forefront of her mind. Monthly she wants to do 5 consecutive days of sobriety on top of quarterly doing her 10 day cleanses (without alcohol). On average she has two drinks a day the other days. There are some days when she does not have a drink at all. She feels she has more control of her consumption at this time and wants to be/feel accountable to others. It has helped change her partners habits as well.       Wt Readings from Last 5 Encounters:   11/14/17 78.7 kg (173 lb 8 oz)   03/29/17 82.6 kg (182 lb)   03/03/17 82.7 kg (182 lb 4 oz)   05/02/16 84.4 kg (186 lb)   12/20/13 72.5 kg (159 lb 12.8 oz)     Today's PHQ-2 Score:   PHQ-2 ( 1999 Pf[employer]) 11/14/2017   Q1: Little interest or pleasure in doing things 0   Q2: Feeling down, depressed or hopeless 0   PHQ-2 Score 0   Q1: Little interest or pleasure in doing things Not at all   Q2: Feeling down, depressed or hopeless Not at all   PHQ-2 Score 0     Abuse: Current or Past(Physical, Sexual or Emotional)- No  Do you feel safe in your environment - Yes    Social History   Substance Use Topics     Smoking status: Former Smoker     Years: 6.00     Types: Cigarettes     Quit date: 7/1/2013     Smokeless tobacco: Never Used      Comment: 1-2 cigarettes a day     Alcohol use Yes      Comment: occasional      The patient does not drink >3 drinks per day nor >7 drinks per week.    Reviewed orders with patient.  Reviewed health maintenance and updated orders accordingly - Yes  BP Readings from Last 3 Encounters:   11/14/17 115/76   03/29/17 112/72   03/03/17 118/76    Wt Readings from Last 3  Encounters:   11/14/17 78.7 kg (173 lb 8 oz)   03/29/17 82.6 kg (182 lb)   03/03/17 82.7 kg (182 lb 4 oz)        Patient Active Problem List   Diagnosis     Contraceptive, management NEC     Seasonal allergic rhinitis     CARDIOVASCULAR SCREENING; LDL GOAL LESS THAN 160     Nondependent alcohol abuse     Anxiety     Skin exam, screening for cancer     Multiple pigmented nevi     Congenital nevus     Left-sided low back pain with left-sided sciatica     Past Surgical History:   Procedure Laterality Date     NO HISTORY OF SURGERY         Social History   Substance Use Topics     Smoking status: Former Smoker     Years: 6.00     Types: Cigarettes     Quit date: 7/1/2013     Smokeless tobacco: Never Used      Comment: 1-2 cigarettes a day     Alcohol use Yes      Comment: occasional      Family History   Problem Relation Age of Onset     CANCER No family hx of      skin check         Current Outpatient Prescriptions   Medication Sig Dispense Refill     IBUPROFEN PO        Prenatal Vit-Fe Fumarate-FA (PRENATAL MULTIVITAMIN  PLUS IRON) 27-0.8 MG TABS per tablet Take 1 tablet by mouth daily       VITAMIN D, CHOLECALCIFEROL, PO Take by mouth daily       No Known Allergies  Recent Labs   Lab Test  05/02/16   1050  12/19/11   0820   LDL  78  56   HDL  113  99   TRIG  42  51      Mammogram not appropriate for this patient based on age.  Pertinent mammograms are reviewed under the imaging tab.  History of abnormal Pap smear:   NO - age 30- 65 PAP every 3 years recommended  Last 3 Pap and HPV Results:   PAP / HPV Latest Ref Rng & Units 5/2/2016 7/16/2012   PAP - NIL NIL   HPV 16 DNA NEG Negative -   HPV 18 DNA NEG Negative -   OTHER HR HPV NEG Negative -     Reviewed and updated as needed this visit by clinical staff  Tobacco  Allergies  Meds  Med Hx  Surg Hx  Fam Hx  Soc Hx      Reviewed and updated as needed this visit by Provider        Past Medical History:   Diagnosis Date     Contraceptive, management NEC     On  "Nuvaring     Tobacco use disorder     Smokes - 4 cigs. Social smoker.      Past Surgical History:   Procedure Laterality Date     NO HISTORY OF SURGERY       Obstetric History     No data available      Review of Systems   ROS: 10 point ROS neg other than the symptoms noted above in the HPI.    This document serves as a record of the services and decisions personally performed and made by Maura Benavides MD. It was created on his/her behalf by Fabiola Ward, trained medical scribe. The creation of this document is based the provider's statements to the medical scribes.    Scribe Fabiola Ward, November 14, 2017   OBJECTIVE:   /76  Pulse 65  Temp 98  F (36.7  C) (Oral)  Resp 12  Ht 1.676 m (5' 6\")  Wt 78.7 kg (173 lb 8 oz)  LMP 10/25/2017 (Approximate)  SpO2 100%  BMI 28 kg/m2  Physical Exam  GENERAL: healthy, alert and no distress  EYES: Eyes grossly normal to inspection, PERRL and conjunctivae and sclerae normal  HENT: ear canals and TM's normal, nose and mouth without ulcers or lesions  NECK: no adenopathy, no asymmetry, masses, or scars and thyroid normal to palpation  RESP: lungs clear to auscultation - no rales, rhonchi or wheezes  BREAST: normal without masses, tenderness or nipple discharge and no palpable axillary masses or adenopathy  CV: regular rate and rhythm, normal S1 S2, no S3 or S4, no murmur, click or rub, no peripheral edema and peripheral pulses strong  ABDOMEN: soft, nontender, no hepatosplenomegaly, no masses and bowel sounds normal  MS: no gross musculoskeletal defects noted, no edema  SKIN: no suspicious lesions or rashes  NEURO: Normal strength and tone, mentation intact and speech normal  PSYCH: mentation appears normal, affect normal/bright  ASSESSMENT/PLAN:   1. Routine general medical examination at a health care facility  PAP due 2019. Declines fasting labs. Health maintenance utd    2. Anxiety  Stable.     3. Mood problem: episode of  manic symptoms  Unclear etiology. " "?related to sleep schedule changes.  She did not do anything impulsive and denied hallucinations with the episode. However, she did have decreased appetite with weight loss and felt she needed less sleep than normal. Her mood has returned to normal now. Pt declines seeing psych at this time and would like to have her friends and family monitor her for further episodes. If there is another episode, she agrees to let me know right away.    4. Nondependent alcohol abuse  Drinks an average of two drinks a day. She is trying to cut back with five days of sobriety each month and quarterly 10 day cleanses.     5. Irregular periods  Pregnancy test negative.   - Beta HCG qual IFA urine    6. Need for prophylactic vaccination and inoculation against influenza    - FLU VAC, SPLIT VIRUS IM > 3 YO (QUADRIVALENT) [48298]  - Vaccine Administration, Initial [51206]        COUNSELING:  Reviewed preventive health counseling, as reflected in patient instructions   reports that she quit smoking about 4 years ago. Her smoking use included Cigarettes. She quit after 6.00 years of use. She has never used smokeless tobacco.  Estimated body mass index is 28 kg/(m^2) as calculated from the following:    Height as of this encounter: 1.676 m (5' 6\").    Weight as of this encounter: 78.7 kg (173 lb 8 oz).   Weight management plan: Discussed healthy diet and exercise guidelines and patient will follow up in 12 months in clinic to re-evaluate.    Counseling Resources:  ATP IV Guidelines  Pooled Cohorts Equation Calculator  Breast Cancer Risk Calculator  FRAX Risk Assessment  ICSI Preventive Guidelines  Dietary Guidelines for Americans, 2010  USDA's MyPlate  ASA Prophylaxis  Lung CA Screening    The information in this document, created by the medical scribe for me, accurately reflects the services I personally performed and the decisions made by me. I have reviewed and approved this document for accuracy. 11/14/17    Maura Benavides MD  [place] [facility]      Injectable Influenza Immunization Documentation    1.  Is the person to be vaccinated sick today?   No    2. Does the person to be vaccinated have an allergy to a component   of the vaccine?   No  Egg Allergy Algorithm Link    3. Has the person to be vaccinated ever had a serious reaction   to influenza vaccine in the past?   No    4. Has the person to be vaccinated ever had Guillain-Barré syndrome?   No    Form completed by Ronda Fitzpatrick MA

## 2017-11-14 NOTE — MR AVS SNAPSHOT
After Visit Summary   11/14/2017    Tiana Soto    MRN: [id number]           Patient Information     Date Of Birth          1985        Visit Information        Provider Department      11/14/2017 9:20 AM Maura Benavides MD [facility]        Today's Diagnoses     Routine general medical examination at a health care facility    -  1    Anxiety        Manic episode (H)        Nondependent alcohol abuse        Irregular periods          Care Instructions    1. Have your friends monitor for episodes that appear manic and let me know right away if that happens.       Preventive Health Recommendations  Female Ages 26 - 39  Yearly exam:   See your health care provider every year in order to    Review health changes.     Discuss preventive care.      Review your medicines if you your doctor has prescribed any.    Until age 30: Get a Pap test every three years (more often if you have had an abnormal result).    After age 30: Talk to your doctor about whether you should have a Pap test every 3 years or have a Pap test with HPV screening every 5 years.   You do not need a Pap test if your uterus was removed (hysterectomy) and you have not had cancer.  You should be tested each year for STDs (sexually transmitted diseases), if you're at risk.   Talk to your provider about how often to have your cholesterol checked.  If you are at risk for diabetes, you should have a diabetes test (fasting glucose).  Shots: Get a flu shot each year. Get a tetanus shot every 10 years.   Nutrition:     Eat at least 5 servings of fruits and vegetables each day.    Eat whole-grain bread, whole-wheat pasta and brown rice instead of white grains and rice.    Talk to your provider about Calcium and Vitamin D.     Lifestyle    Exercise at least 150 minutes a week (30 minutes a day, 5 days of the week). This will help you control your weight and prevent disease.    Limit alcohol to one drink per day.    No smoking.  "    Wear sunscreen to prevent skin cancer.    See your dentist every six months for an exam and cleaning.            Follow-ups after your visit        Who to contact     If you have questions or need follow up information about today's clinic visit or your schedule please contact [facility] directly at [phone].  Normal or non-critical lab and imaging results will be communicated to you by MyChart, letter or phone within 4 business days after the clinic has received the results. If you do not hear from us within 7 days, please contact the clinic through [employer] or phone. If you have a critical or abnormal lab result, we will notify you by phone as soon as possible.  Submit refill requests through [employer] or call your pharmacy and they will forward the refill request to us. Please allow 3 business days for your refill to be completed.          Additional Information About Your Visit        MyChart Information     [employer] gives you secure access to your electronic health record. If you see a primary care provider, you can also send messages to your care team and make appointments. If you have questions, please call your primary care clinic.  If you do not have a primary care provider, please call [phone] and they will assist you.        Care EveryWhere ID     This is your Care EveryWhere ID. This could be used by other organizations to access your [facility] medical records  [id number]        Your Vitals Were     Pulse Temperature Respirations Height Last Period Pulse Oximetry    65 98  F (36.7  C) (Oral) 12 5' 6\" (1.676 m) 10/25/2017 (Approximate) 100%    BMI (Body Mass Index)                   28 kg/m2            Blood Pressure from Last 3 Encounters:   11/14/17 115/76   03/29/17 112/72   03/03/17 118/76    Weight from Last 3 Encounters:   11/14/17 173 lb 8 oz (78.7 kg)   03/29/17 182 lb (82.6 kg)   03/03/17 182 lb 4 oz (82.7 kg)              We Performed the Following     Beta HCG qual IFA " urine        Primary Care Provider Office Phone # Fax #    Maura Benavides [phone] [address]  [facility] [address]        Equal Access to Services     JUANA VALDOVINOS : Hadii kaylin velasquez nando Pina, wafélixda luqbrien, qaybta karitchieda leobardo, darcie lind laJenniferdilcia harman. So [facility] [phone].    ATENCIÓN: Si habla español, tiene a meneses disposición servicios gratuitos de asistencia lingüística. Llame al [phone].    We comply with applicable federal civil rights laws and [place] laws. We do not discriminate on the basis of race, color, national origin, age, disability, sex, sexual orientation, or gender identity.            Thank you!     Thank you for choosing [facility]  for your care. Our goal is always to provide you with excellent care. Hearing back from our patients is one way we can continue to improve our services. Please take a few minutes to complete the written survey that you may receive in the mail after your visit with us. Thank you!             Your Updated Medication List - Protect others around you: Learn how to safely use, store and throw away your medicines at www.disposemymeds.org.          This list is accurate as of: 11/14/17 10:17 AM.  Always use your most recent med list.                   Brand Name Dispense Instructions for use Diagnosis    IBUPROFEN PO           prenatal multivitamin plus iron 27-0.8 MG Tabs per tablet      Take 1 tablet by mouth daily        VITAMIN D (CHOLECALCIFEROL) PO      Take by mouth daily

## 2017-11-14 NOTE — PATIENT INSTRUCTIONS
1. Have your friends monitor for episodes that appear manic and let me know right away if that happens.       Preventive Health Recommendations  Female Ages 26 - 39  Yearly exam:   See your health care provider every year in order to    Review health changes.     Discuss preventive care.      Review your medicines if you your doctor has prescribed any.    Until age 30: Get a Pap test every three years (more often if you have had an abnormal result).    After age 30: Talk to your doctor about whether you should have a Pap test every 3 years or have a Pap test with HPV screening every 5 years.   You do not need a Pap test if your uterus was removed (hysterectomy) and you have not had cancer.  You should be tested each year for STDs (sexually transmitted diseases), if you're at risk.   Talk to your provider about how often to have your cholesterol checked.  If you are at risk for diabetes, you should have a diabetes test (fasting glucose).  Shots: Get a flu shot each year. Get a tetanus shot every 10 years.   Nutrition:     Eat at least 5 servings of fruits and vegetables each day.    Eat whole-grain bread, whole-wheat pasta and brown rice instead of white grains and rice.    Talk to your provider about Calcium and Vitamin D.     Lifestyle    Exercise at least 150 minutes a week (30 minutes a day, 5 days of the week). This will help you control your weight and prevent disease.    Limit alcohol to one drink per day.    No smoking.     Wear sunscreen to prevent skin cancer.    See your dentist every six months for an exam and cleaning.

## 2017-11-14 NOTE — NURSING NOTE
"Chief Complaint   Patient presents with     Physical       Initial There were no vitals taken for this visit. Estimated body mass index is 29.38 kg/(m^2) as calculated from the following:    Height as of 3/29/17: 5' 6\" (1.676 m).    Weight as of 3/29/17: 182 lb (82.6 kg).  Medication Reconciliation: anna marie Fitzpatrick MA     "

## 2018-11-21 ENCOUNTER — OFFICE VISIT (OUTPATIENT)
Dept: FAMILY MEDICINE | Facility: CLINIC | Age: 33
End: 2018-11-21
Payer: COMMERCIAL

## 2018-11-21 VITALS
SYSTOLIC BLOOD PRESSURE: 94 MMHG | DIASTOLIC BLOOD PRESSURE: 47 MMHG | OXYGEN SATURATION: 100 % | HEIGHT: 66 IN | WEIGHT: 174.5 LBS | HEART RATE: 61 BPM | TEMPERATURE: 97.7 F | BODY MASS INDEX: 28.04 KG/M2

## 2018-11-21 DIAGNOSIS — Z23 NEED FOR PROPHYLACTIC VACCINATION AND INOCULATION AGAINST INFLUENZA: ICD-10-CM

## 2018-11-21 DIAGNOSIS — Z32.01 PREGNANCY EXAMINATION OR TEST, POSITIVE RESULT: Primary | ICD-10-CM

## 2018-11-21 LAB — BETA HCG QUAL IFA URINE: POSITIVE

## 2018-11-21 PROCEDURE — 90686 IIV4 VACC NO PRSV 0.5 ML IM: CPT | Performed by: FAMILY MEDICINE

## 2018-11-21 PROCEDURE — 84703 CHORIONIC GONADOTROPIN ASSAY: CPT | Performed by: FAMILY MEDICINE

## 2018-11-21 PROCEDURE — 99213 OFFICE O/P EST LOW 20 MIN: CPT | Mod: 25 | Performed by: FAMILY MEDICINE

## 2018-11-21 PROCEDURE — 90471 IMMUNIZATION ADMIN: CPT | Performed by: FAMILY MEDICINE

## 2018-11-21 NOTE — MR AVS SNAPSHOT
After Visit Summary   11/21/2018    Tiana Soto    MRN: [id number]           Patient Information     Date Of Birth          1985        Visit Information        Provider Department      11/21/2018 7:40 AM Maura Benavides MD [facility]        Today's Diagnoses     Pregnancy examination or test, positive result    -  1    Need for prophylactic vaccination and inoculation against influenza          Care Instructions      Adapting to Pregnancy: First Trimester    As your body adjusts, you may have to change or limit your daily activities. You ll need more rest. You may also need to use the energy you have more wisely.  Your changing body  Almost every part of your body is affected as you adapt to pregnancy. The uterus and cervix will begin to soften right away. You may not look very pregnant during the first 3 months. But you are likely to have some common signs of early pregnancy:    Nausea    Fatigue    Frequent urination    Mood swings    Bloating of the belly    Constipation    Heartburn    Missed or light periods (first trimester bleeding)    Nipple or breast tenderness and breast swelling  It s not too late to start good habits  What matters most is protecting your baby from this moment on. If you smoke, drink alcohol, or use drugs, now is the time to stop. If you need help, talk with your healthcare provider:    Smoking increases the risk of stillbirth or having a low-birth-weight baby. If you smoke, quit now.    Alcohol and drugs have been linked with miscarriage, birth defects, intellectual disability, and low birth weight. Do not drink alcohol or take drugs.  Tips to relieve nausea  Although nausea can happen at any time of the day, it may be worse in the morning. To help prevent nausea:    Eat small, light meals at frequent intervals.    Drink fluids often.    Get up slowly. Eat a few unsalted crackers before you get out of bed.    Avoid smells that bother you.    Avoid spicy  "and fatty foods.    Eat an ice pop in your favorite flavor.    Get plenty of rest.    Ask your healthcare provider about taking brendon or vitamin B6 for nausea and vomiting.    Talk with your healthcare provider if you take vitamins that upset your stomach.  Work concerns  The end of the first trimester is a good time to discuss working during pregnancy with your employer. Follow your healthcare provider s advice if your job needs you to stand for a long time, work with hazardous tools, or even sit at a desk all day. Your workspace, workload, or scheduled hours may need to be adjusted. Perhaps you can change body postures more often or take an extra break.  Advice for travel  Talk to your healthcare provider first, but the second trimester may be the best time for any travel. You may be advised to avoid certain trips while you re pregnant. Food and water can be concerns in developing countries. Travel by car is a good choice, as you can stop, get out, and stretch. Bring snacks and water along. Fasten the lap belt below your belly, low over your hips. Also be sure to wear the shoulder harness.  Intimacy  Unless your healthcare provider tells you to, there is no reason to stop having sex while you re pregnant. You or your partner may notice changes in desire. Desire may be less in the first trimester, due to nausea and fatigue. In the second trimester, sex may be very enjoyable. The third trimester can be a challenge comfort-wise. Try different positions and see what s best for you both.  Date Last Reviewed: 10/1/2017    8137-0563 [employer]. [address] [place] [address], [place] [address]. All rights reserved. This information is not intended as a substitute for professional medical care. Always follow your healthcare professional's instructions.      First Trimester Screening   What is \"screening\"?  A screening test tells us the chances that a fetus (unborn baby) has a certain disease or condition. There " may be a high risk or a low risk. This is different from a diagnostic test, which tells us if a fetus actually has that condition.   For birth defects, the most common screening tests are for neural tube defects (like spina bifida), Down syndrome, trisomy 18 and trisomy 13. If a screening test shows a high risk for one of these problems, you may choose to have a diagnostic test.   What is first trimester screening?  First trimester screening estimates the risk for Down syndrome, trisomy 18 and trisomy 13. It does not tell us the risk for other birth defects or mental retardation. This test requires:    A blood test. This measures two chemicals in your blood, called hCG and BROOKS-A. These chemicals are made by the pregnancy.    An ultrasound. This will measure the amount of fluid at the back of the fetus's neck (an area called the nuchal translucency).    Your age.  All of this will help us estimate the risk for certain problems.    Down syndrome is more likely with higher levels of hCG and lower levels of BROOKS-A.    Trisomy 18 and trisomy 13 are more likely with lower levels of both hCG and BROOKS-A.    If there is a large amount of fluid at the back of the neck, there may be a greater risk for Down syndrome, trisomy 18, trisomy 13, heart problems or other conditions.  We can add another blood test later to get a better estimate of your risk. This is called a modified screen. We would be happy to discuss this with you.  Who should consider first trimester screening?  All women can have first trimester screening during the early part of their pregnancy. This test is usually done between the start of the 11th week and the end of the 13th week.   Before you decide to have this test, talk to your care provider or genetic counselor. He or she will explain the details and answer your questions.   What happens during the test?  You will meet with a genetic counselor at [facility]. He or she will  "explain the testing in detail.   You will need to sign a consent form. This states that you know the risks and benefits of the screening tests. When you sign the form, you tell us it is okay to do the tests. Do not sign unless you understand what will happen during and after the tests.   We will do a blood test and an ultrasound. Results from the blood test will be ready in about seven days.   How well does the test work?  Most tests show a low risk for Down syndrome, trisomy 18 and trisomy 13. Even if the risk is low, there is still a small chance that your baby will have problems.   About 5 percent of tests show an \"increased risk\" for problems. This does not mean that a baby will have problems. It means that more tests are needed. Your genetic counselor will talk with you about the risks and benefits of further testing.   Of fetuses that have Down syndrome, trisomy 18 or trisomy 13, most will show an \"increased risk\" on this test. But the test is not perfect. Some fetuses with these conditions will have a normal test result.   Should I consider second trimester screening as well?  Yes. First trimester screening does not tell you the risk for spina bifida and other birth defects that involve an opening on the fetus's skin. To screen for these problems, you may wish to have MSAFP screening.   The MSAFP test is a blood test. It is usually done at 15 to 20 weeks of pregnancy. The test measures the amount of a certain protein (called AFP) in your blood. High levels of AFP often suggest a greater risk for certain birth defects or pregnancy problems.   At around 18 weeks or later, your care provider may also use an ultrasound to screen for birth defects or other problems.   Before you decide to have these tests, talk to your care provider or genetic counselor. He or she will explain the details and answer your questions.   Will my insurance pay for first trimester screening?  You will need to call your insurance company " to find out. Most health plans will cover this test if you have certain medical problems. Some health plans do not cover this screening test.   If insurance won't pay, you may choose to pay for this test yourself. Or you may choose to have different screening tests in the second trimester. These are almost as accurate as first trimester screening, and insurance is more likely to cover them.  Where can I find more information?  If you have any questions, or if you would like to discuss prenatal testing, please call one of our Maternal-Fetal Medicine Centers.   [facility], [address]  [address] [place] [id number]  [phone]   [facility]  [address], [address],   [place], [place] [address] [phone]   [facility] [address], [address],   [place] [address] [phone]   For informational purposes only. Not to replace the advice of your health care provider.   Copyright   2004 [employer] [facility]. All rights reserved. "[employer] 166317 - REV 11/16.            Follow-ups after your visit        Who to contact     If you have questions or need follow up information about today's clinic visit or your schedule please contact [facility] directly at [phone].  Normal or non-critical lab and imaging results will be communicated to you by MyChart, letter or phone within 4 business days after the clinic has received the results. If you do not hear from us within 7 days, please contact the clinic through MyChart or phone. If you have a critical or abnormal lab result, we will notify you by phone as soon as possible.  Submit refill requests through [employer] or call your pharmacy and they will forward the refill request to us. Please allow 3 business days for your refill to be completed.          Additional Information About Your Visit        [employer] Information     [employer] gives you  "secure access to your electronic health record. If you see a primary care provider, you can also send messages to your care team and make appointments. If you have questions, please call your primary care clinic.  If you do not have a primary care provider, please call [phone] and they will assist you.        Care EveryWhere ID     This is your Care EveryWhere ID. This could be used by other organizations to access your [place] medical records  [id number]        Your Vitals Were     Pulse Temperature Height Last Period Pulse Oximetry BMI (Body Mass Index)    61 97.7  F (36.5  C) (Oral) 5' 6\" (1.676 m) 10/13/2018 (Exact Date) 100% 28.17 kg/m2       Blood Pressure from Last 3 Encounters:   11/21/18 94/47   11/14/17 115/76   03/29/17 112/72    Weight from Last 3 Encounters:   11/21/18 174 lb 8 oz (79.2 kg)   11/14/17 173 lb 8 oz (78.7 kg)   03/29/17 182 lb (82.6 kg)              We Performed the Following     Beta HCG Qual, Urine - [facility] [place] ([id number])     FLU VACCINE, SPLIT VIRUS, IM (QUADRIVALENT) [66612]- >3 YRS     Vaccine Administration, Initial [[id number]]        Primary Care Provider Office Phone # Fax #    Muara Benavides [phone]       [address]  [facility] [address]        Equal Access to Services     [facility] : Hadii aad ku hadasho Soomaali, waaxda luqadaha, qaybta kaalmada adeandrésyada, darcie harman. So [facility] [phone].    ATENCIÓN: Si habla español, tiene a meneses disposición servicios gratuitos de asistencia lingüística. Llame al [phone].    We comply with applicable federal civil rights laws and [place] laws. We do not discriminate on the basis of race, color, national origin, age, disability, sex, sexual orientation, or gender identity.            Thank you!     Thank you for choosing [facility]  for your care. Our goal is always to provide you with excellent care. Hearing back from our patients is one way we can " continue to improve our services. Please take a few minutes to complete the written survey that you may receive in the mail after your visit with us. Thank you!             Your Updated Medication List - Protect others around you: Learn how to safely use, store and throw away your medicines at www.disposemymeds.org.          This list is accurate as of 11/21/18  8:20 AM.  Always use your most recent med list.                   Brand Name Dispense Instructions for use Diagnosis    IBUPROFEN PO           prenatal multivitamin plus iron 27-0.8 MG Tabs per tablet      Take 1 tablet by mouth daily        VITAMIN D (CHOLECALCIFEROL) PO      Take by mouth daily

## 2018-11-21 NOTE — PATIENT INSTRUCTIONS
Adapting to Pregnancy: First Trimester    As your body adjusts, you may have to change or limit your daily activities. You ll need more rest. You may also need to use the energy you have more wisely.  Your changing body  Almost every part of your body is affected as you adapt to pregnancy. The uterus and cervix will begin to soften right away. You may not look very pregnant during the first 3 months. But you are likely to have some common signs of early pregnancy:    Nausea    Fatigue    Frequent urination    Mood swings    Bloating of the belly    Constipation    Heartburn    Missed or light periods (first trimester bleeding)    Nipple or breast tenderness and breast swelling  It s not too late to start good habits  What matters most is protecting your baby from this moment on. If you smoke, drink alcohol, or use drugs, now is the time to stop. If you need help, talk with your healthcare provider:    Smoking increases the risk of stillbirth or having a low-birth-weight baby. If you smoke, quit now.    Alcohol and drugs have been linked with miscarriage, birth defects, intellectual disability, and low birth weight. Do not drink alcohol or take drugs.  Tips to relieve nausea  Although nausea can happen at any time of the day, it may be worse in the morning. To help prevent nausea:    Eat small, light meals at frequent intervals.    Drink fluids often.    Get up slowly. Eat a few unsalted crackers before you get out of bed.    Avoid smells that bother you.    Avoid spicy and fatty foods.    Eat an ice pop in your favorite flavor.    Get plenty of rest.    Ask your healthcare provider about taking brendon or vitamin B6 for nausea and vomiting.    Talk with your healthcare provider if you take vitamins that upset your stomach.  Work concerns  The end of the first trimester is a good time to discuss working during pregnancy with your employer. Follow your healthcare provider s advice if your job needs you to stand for a  "long time, work with hazardous tools, or even sit at a desk all day. Your workspace, workload, or scheduled hours may need to be adjusted. Perhaps you can change body postures more often or take an extra break.  Advice for travel  Talk to your healthcare provider first, but the second trimester may be the best time for any travel. You may be advised to avoid certain trips while you re pregnant. Food and water can be concerns in developing countries. Travel by car is a good choice, as you can stop, get out, and stretch. Bring snacks and water along. Fasten the lap belt below your belly, low over your hips. Also be sure to wear the shoulder harness.  Intimacy  Unless your healthcare provider tells you to, there is no reason to stop having sex while you re pregnant. You or your partner may notice changes in desire. Desire may be less in the first trimester, due to nausea and fatigue. In the second trimester, sex may be very enjoyable. The third trimester can be a challenge comfort-wise. Try different positions and see what s best for you both.  Date Last Reviewed: 10/1/2017    7872-1987 [employer]. [address] [place] [address]. All rights reserved. This information is not intended as a substitute for professional medical care. Always follow your healthcare professional's instructions.      First Trimester Screening   What is \"screening\"?  A screening test tells us the chances that a fetus (unborn baby) has a certain disease or condition. There may be a high risk or a low risk. This is different from a diagnostic test, which tells us if a fetus actually has that condition.   For birth defects, the most common screening tests are for neural tube defects (like spina bifida), Down syndrome, trisomy 18 and trisomy 13. If a screening test shows a high risk for one of these problems, you may choose to have a diagnostic test.   What is first trimester screening?  First trimester screening estimates " the risk for Down syndrome, trisomy 18 and trisomy 13. It does not tell us the risk for other birth defects or mental retardation. This test requires:    A blood test. This measures two chemicals in your blood, called hCG and BROOKS-A. These chemicals are made by the pregnancy.    An ultrasound. This will measure the amount of fluid at the back of the fetus's neck (an area called the nuchal translucency).    Your age.  All of this will help us estimate the risk for certain problems.    Down syndrome is more likely with higher levels of hCG and lower levels of BROOKS-A.    Trisomy 18 and trisomy 13 are more likely with lower levels of both hCG and BROOKS-A.    If there is a large amount of fluid at the back of the neck, there may be a greater risk for Down syndrome, trisomy 18, trisomy 13, heart problems or other conditions.  We can add another blood test later to get a better estimate of your risk. This is called a modified screen. We would be happy to discuss this with you.  Who should consider first trimester screening?  All women can have first trimester screening during the early part of their pregnancy. This test is usually done between the start of the 11th week and the end of the 13th week.   Before you decide to have this test, talk to your care provider or genetic counselor. He or she will explain the details and answer your questions.   What happens during the test?  You will meet with a genetic counselor at [facility]. He or she will explain the testing in detail.   You will need to sign a consent form. This states that you know the risks and benefits of the screening tests. When you sign the form, you tell us it is okay to do the tests. Do not sign unless you understand what will happen during and after the tests.   We will do a blood test and an ultrasound. Results from the blood test will be ready in about seven days.   How well does the test work?  Most tests show a low risk for  "Down syndrome, trisomy 18 and trisomy 13. Even if the risk is low, there is still a small chance that your baby will have problems.   About 5 percent of tests show an \"increased risk\" for problems. This does not mean that a baby will have problems. It means that more tests are needed. Your genetic counselor will talk with you about the risks and benefits of further testing.   Of fetuses that have Down syndrome, trisomy 18 or trisomy 13, most will show an \"increased risk\" on this test. But the test is not perfect. Some fetuses with these conditions will have a normal test result.   Should I consider second trimester screening as well?  Yes. First trimester screening does not tell you the risk for spina bifida and other birth defects that involve an opening on the fetus's skin. To screen for these problems, you may wish to have MSAFP screening.   The MSAFP test is a blood test. It is usually done at 15 to 20 weeks of pregnancy. The test measures the amount of a certain protein (called AFP) in your blood. High levels of AFP often suggest a greater risk for certain birth defects or pregnancy problems.   At around 18 weeks or later, your care provider may also use an ultrasound to screen for birth defects or other problems.   Before you decide to have these tests, talk to your care provider or genetic counselor. He or she will explain the details and answer your questions.   Will my insurance pay for first trimester screening?  You will need to call your insurance company to find out. Most health plans will cover this test if you have certain medical problems. Some health plans do not cover this screening test.   If insurance won't pay, you may choose to pay for this test yourself. Or you may choose to have different screening tests in the second trimester. These are almost as accurate as first trimester screening, and insurance is more likely to cover them.  Where can I find more information?  If you have any questions, " or if you would like to discuss prenatal testing, please call one of our Maternal-Fetal Medicine Centers.   [facility], [address]  [address] [place] [address], [place], [place] [address] [phone]   [facility]  [address], [address],   [place], [place] [address] [phone]   [facility] [address], [address],   [place] [address] [phone]   For informational purposes only. Not to replace the advice of your health care provider.   Copyright   2004 [employer]. All rights reserved. [employer] [id number] - REV 11/16.

## 2018-11-21 NOTE — PROGRESS NOTES
SUBJECTIVE:   Tiana Soto is a 33 year old female who presents to clinic today for the following health issues:    SUBJECTIVE: Savana is a 33 year old   female who comes in today with her partner Harris for pregnancy confirmation.  She had a positive pregnancy test at home on 2018,  Date of last menstrual period: 10/13/2018 pretty sure  Menstrual cycles are frequency: q month.    This pregnancy was planned.   She is taking a prenatal vitamin.  She is also taking a prenatal and fish oil.   She currently complains of breast tenderness and fatigue.  She denies any abdominal pain, vaginal bleeding or fevers.    Previous pregnancies and outcomes: No previous pregnancy.       Problem list and histories reviewed & adjusted, as indicated.  Additional history: as documented    Ear pain started about a week ago she thinks that it may be due to her swimming recently.  Today is better.  She denies any difficulty hearing or drainage from the ear.       Patient Active Problem List   Diagnosis     Contraceptive, management NEC     Seasonal allergic rhinitis     CARDIOVASCULAR SCREENING; LDL GOAL LESS THAN 160     Nondependent alcohol abuse     Anxiety     Skin exam, screening for cancer     Multiple pigmented nevi     Congenital nevus     Left-sided low back pain with left-sided sciatica     Past Surgical History:   Procedure Laterality Date     NO HISTORY OF SURGERY         Social History   Substance Use Topics     Smoking status: Former Smoker     Years: 14.00     Types: Cigarettes     Start date: 2002     Quit date: 2016     Smokeless tobacco: Never Used      Comment: 1-2 cigarettes a day     Alcohol use Yes      Comment: occasional      Family History   Problem Relation Age of Onset     Depression Father      Cancer No family hx of      skin check         Current Outpatient Prescriptions   Medication Sig Dispense Refill     IBUPROFEN PO        Prenatal Vit-Fe Fumarate-FA (PRENATAL MULTIVITAMIN  PLUS IRON) 27-0.8  "MG TABS per tablet Take 1 tablet by mouth daily       VITAMIN D, CHOLECALCIFEROL, PO Take by mouth daily       No Known Allergies  Recent Labs   Lab Test  05/02/16   1050  12/19/11   0820   LDL  78  56   HDL  113  99   TRIG  42  51      BP Readings from Last 3 Encounters:   11/21/18 94/47   11/14/17 115/76   03/29/17 112/72    Wt Readings from Last 3 Encounters:   11/21/18 174 lb 8 oz (79.2 kg)   11/14/17 173 lb 8 oz (78.7 kg)   03/29/17 182 lb (82.6 kg)              Reviewed and updated as needed this visit by clinical staff  Tobacco  Allergies  Meds       Reviewed and updated as needed this visit by Provider         ROS:  As above     OBJECTIVE:     BP 94/47  Pulse 61  Temp 97.7  F (36.5  C) (Oral)  Ht 5' 6\" (1.676 m)  Wt 174 lb 8 oz (79.2 kg)  LMP 10/13/2018 (Exact Date)  SpO2 100%  BMI 28.17 kg/m2  Body mass index is 28.17 kg/(m^2).     GENERAL APPEARANCE:  alert and no acute distress  EYES:anicteric, no conjunctival redness  HENT: ear canals and TM's normal and nose and mouth without ulcers or lesions; OP normal  NECK: supple, nontender  RESP: lungs clear to auscultation - no rales, rhonchi or wheezes  CV: regular rates and rhythm, normal S1 S2, no S3 or S4 and no murmur, click or rub -   ABD: soft, nontender, no masses    Diagnostic Test Results:  Results for orders placed or performed in visit on 11/21/18 (from the past 24 hour(s))   Beta HCG Qual, Urine - [facility] [place] ([id number])   Result Value Ref Range    Beta HCG Qual IFA Urine Positive (A) NEG^Negative         Estimated Date of Delivery: Data Unavailable       Estimated Due Date is 7/20/2019 Gestational Age Today is 5 week(s), 4 day(s) or 1.28 month(s) months.    ASSESSMENT/PLAN:     ASSESSMENT: positive upt    PLAN: Prenatal vitamins daily.  Given first trimester precautions/warning signs such as abdominal pain, vaginal bleeding or fevers.  Encouraged healthy lifestyle and avoidance of alcohol and drugs .  All questions answered. " Recommended first OB appointment between 8 and 12 weeks.  Call or come in for concerns that arise sooner.          Patient Instructions     Adapting to Pregnancy: First Trimester    As your body adjusts, you may have to change or limit your daily activities. You ll need more rest. You may also need to use the energy you have more wisely.  Your changing body  Almost every part of your body is affected as you adapt to pregnancy. The uterus and cervix will begin to soften right away. You may not look very pregnant during the first 3 months. But you are likely to have some common signs of early pregnancy:    Nausea    Fatigue    Frequent urination    Mood swings    Bloating of the belly    Constipation    Heartburn    Missed or light periods (first trimester bleeding)    Nipple or breast tenderness and breast swelling  It s not too late to start good habits  What matters most is protecting your baby from this moment on. If you smoke, drink alcohol, or use drugs, now is the time to stop. If you need help, talk with your healthcare provider:    Smoking increases the risk of stillbirth or having a low-birth-weight baby. If you smoke, quit now.    Alcohol and drugs have been linked with miscarriage, birth defects, intellectual disability, and low birth weight. Do not drink alcohol or take drugs.  Tips to relieve nausea  Although nausea can happen at any time of the day, it may be worse in the morning. To help prevent nausea:    Eat small, light meals at frequent intervals.    Drink fluids often.    Get up slowly. Eat a few unsalted crackers before you get out of bed.    Avoid smells that bother you.    Avoid spicy and fatty foods.    Eat an ice pop in your favorite flavor.    Get plenty of rest.    Ask your healthcare provider about taking brendon or vitamin B6 for nausea and vomiting.    Talk with your healthcare provider if you take vitamins that upset your stomach.  Work concerns  The end of the first trimester is a good  "time to discuss working during pregnancy with your employer. Follow your healthcare provider s advice if your job needs you to stand for a long time, work with hazardous tools, or even sit at a desk all day. Your workspace, workload, or scheduled hours may need to be adjusted. Perhaps you can change body postures more often or take an extra break.  Advice for travel  Talk to your healthcare provider first, but the second trimester may be the best time for any travel. You may be advised to avoid certain trips while you re pregnant. Food and water can be concerns in developing countries. Travel by car is a good choice, as you can stop, get out, and stretch. Bring snacks and water along. Fasten the lap belt below your belly, low over your hips. Also be sure to wear the shoulder harness.  Intimacy  Unless your healthcare provider tells you to, there is no reason to stop having sex while you re pregnant. You or your partner may notice changes in desire. Desire may be less in the first trimester, due to nausea and fatigue. In the second trimester, sex may be very enjoyable. The third trimester can be a challenge comfort-wise. Try different positions and see what s best for you both.  Date Last Reviewed: 10/1/2017    0069-7492 [employer]. [address] [place] [address], [place] [address]. All rights reserved. This information is not intended as a substitute for professional medical care. Always follow your healthcare professional's instructions.      First Trimester Screening   What is \"screening\"?  A screening test tells us the chances that a fetus (unborn baby) has a certain disease or condition. There may be a high risk or a low risk. This is different from a diagnostic test, which tells us if a fetus actually has that condition.   For birth defects, the most common screening tests are for neural tube defects (like spina bifida), Down syndrome, trisomy 18 and trisomy 13. If a screening test shows a high risk for " one of these problems, you may choose to have a diagnostic test.   What is first trimester screening?  First trimester screening estimates the risk for Down syndrome, trisomy 18 and trisomy 13. It does not tell us the risk for other birth defects or mental retardation. This test requires:    A blood test. This measures two chemicals in your blood, called hCG and BROOKS-A. These chemicals are made by the pregnancy.    An ultrasound. This will measure the amount of fluid at the back of the fetus's neck (an area called the nuchal translucency).    Your age.  All of this will help us estimate the risk for certain problems.    Down syndrome is more likely with higher levels of hCG and lower levels of BROOKS-A.    Trisomy 18 and trisomy 13 are more likely with lower levels of both hCG and BROOKS-A.    If there is a large amount of fluid at the back of the neck, there may be a greater risk for Down syndrome, trisomy 18, trisomy 13, heart problems or other conditions.  We can add another blood test later to get a better estimate of your risk. This is called a modified screen. We would be happy to discuss this with you.  Who should consider first trimester screening?  All women can have first trimester screening during the early part of their pregnancy. This test is usually done between the start of the 11th week and the end of the 13th week.   Before you decide to have this test, talk to your care provider or genetic counselor. He or she will explain the details and answer your questions.   What happens during the test?  You will meet with a genetic counselor at [facility]. He or she will explain the testing in detail.   You will need to sign a consent form. This states that you know the risks and benefits of the screening tests. When you sign the form, you tell us it is okay to do the tests. Do not sign unless you understand what will happen during and after the tests.   We will do a blood test and  "an ultrasound. Results from the blood test will be ready in about seven days.   How well does the test work?  Most tests show a low risk for Down syndrome, trisomy 18 and trisomy 13. Even if the risk is low, there is still a small chance that your baby will have problems.   About 5 percent of tests show an \"increased risk\" for problems. This does not mean that a baby will have problems. It means that more tests are needed. Your genetic counselor will talk with you about the risks and benefits of further testing.   Of fetuses that have Down syndrome, trisomy 18 or trisomy 13, most will show an \"increased risk\" on this test. But the test is not perfect. Some fetuses with these conditions will have a normal test result.   Should I consider second trimester screening as well?  Yes. First trimester screening does not tell you the risk for spina bifida and other birth defects that involve an opening on the fetus's skin. To screen for these problems, you may wish to have MSAFP screening.   The MSAFP test is a blood test. It is usually done at 15 to 20 weeks of pregnancy. The test measures the amount of a certain protein (called AFP) in your blood. High levels of AFP often suggest a greater risk for certain birth defects or pregnancy problems.   At around 18 weeks or later, your care provider may also use an ultrasound to screen for birth defects or other problems.   Before you decide to have these tests, talk to your care provider or genetic counselor. He or she will explain the details and answer your questions.   Will my insurance pay for first trimester screening?  You will need to call your insurance company to find out. Most health plans will cover this test if you have certain medical problems. Some health plans do not cover this screening test.   If insurance won't pay, you may choose to pay for this test yourself. Or you may choose to have different screening tests in the second trimester. These are almost as " accurate as first trimester screening, and insurance is more likely to cover them.  Where can I find more information?  If you have any questions, or if you would like to discuss prenatal testing, please call one of our Maternal[facility]Fetal Medicine Centers.   [facility], [address]  [address] [place] [address], [place] [address]  [phone]   [facility]  [address], [address],   [place], [place] [address] [phone]   [place] [address] [address] [address], [address],   [place] [address] [phone]   For informational purposes only. Not to replace the advice of your health care provider.   Copyright   2004 [facility]. All rights reserved. [employer]License 934623 - REV 11/16.        Maura Benavides M.D.                     Injectable Influenza Immunization Documentation    1.  Is the person to be vaccinated sick today?   No    2. Does the person to be vaccinated have an allergy to a component   of the vaccine?   No  Egg Allergy Algorithm Link    3. Has the person to be vaccinated ever had a serious reaction   to influenza vaccine in the past?   No    4. Has the person to be vaccinated ever had Guillain-Barré syndrome?   No    Form completed by Liana Rojo MA

## 2018-12-11 ENCOUNTER — OFFICE VISIT (OUTPATIENT)
Dept: OBGYN | Facility: CLINIC | Age: 33
End: 2018-12-11
Attending: ADVANCED PRACTICE MIDWIFE
Payer: COMMERCIAL

## 2018-12-11 ENCOUNTER — ANCILLARY PROCEDURE (OUTPATIENT)
Dept: ULTRASOUND IMAGING | Facility: CLINIC | Age: 33
End: 2018-12-11
Attending: ADVANCED PRACTICE MIDWIFE
Payer: COMMERCIAL

## 2018-12-11 VITALS
WEIGHT: 169 LBS | SYSTOLIC BLOOD PRESSURE: 122 MMHG | DIASTOLIC BLOOD PRESSURE: 76 MMHG | HEART RATE: 65 BPM | BODY MASS INDEX: 27.16 KG/M2 | HEIGHT: 66 IN

## 2018-12-11 DIAGNOSIS — Z34.00 SUPERVISION OF NORMAL FIRST PREGNANCY, ANTEPARTUM: Primary | ICD-10-CM

## 2018-12-11 DIAGNOSIS — Z34.91 ENCOUNTER FOR SUPERVISION OF NORMAL PREGNANCY IN FIRST TRIMESTER, UNSPECIFIED GRAVIDITY: ICD-10-CM

## 2018-12-11 LAB
ABO + RH BLD: NORMAL
ABO + RH BLD: NORMAL
BASOPHILS # BLD AUTO: 0 10E9/L (ref 0–0.2)
BASOPHILS NFR BLD AUTO: 0.3 %
BLD GP AB SCN SERPL QL: NORMAL
BLOOD BANK CMNT PATIENT-IMP: NORMAL
DEPRECATED CALCIDIOL+CALCIFEROL SERPL-MC: 29 UG/L (ref 20–75)
DIFFERENTIAL METHOD BLD: NORMAL
EOSINOPHIL # BLD AUTO: 0.1 10E9/L (ref 0–0.7)
EOSINOPHIL NFR BLD AUTO: 0.8 %
ERYTHROCYTE [DISTWIDTH] IN BLOOD BY AUTOMATED COUNT: 11.8 % (ref 10–15)
HBV SURFACE AB SERPL IA-ACNC: 91.59 M[IU]/ML
HBV SURFACE AG SERPL QL IA: NONREACTIVE
HCT VFR BLD AUTO: 39.9 % (ref 35–47)
HGB BLD-MCNC: 13.4 G/DL (ref 11.7–15.7)
HIV 1+2 AB+HIV1 P24 AG SERPL QL IA: NONREACTIVE
IMM GRANULOCYTES # BLD: 0 10E9/L (ref 0–0.4)
IMM GRANULOCYTES NFR BLD: 0.3 %
LYMPHOCYTES # BLD AUTO: 1.8 10E9/L (ref 0.8–5.3)
LYMPHOCYTES NFR BLD AUTO: 24.8 %
MCH RBC QN AUTO: 30.2 PG (ref 26.5–33)
MCHC RBC AUTO-ENTMCNC: 33.6 G/DL (ref 31.5–36.5)
MCV RBC AUTO: 90 FL (ref 78–100)
MONOCYTES # BLD AUTO: 0.6 10E9/L (ref 0–1.3)
MONOCYTES NFR BLD AUTO: 8.2 %
NEUTROPHILS # BLD AUTO: 4.7 10E9/L (ref 1.6–8.3)
NEUTROPHILS NFR BLD AUTO: 65.6 %
NRBC # BLD AUTO: 0 10*3/UL
NRBC BLD AUTO-RTO: 0 /100
PLATELET # BLD AUTO: 205 10E9/L (ref 150–450)
RBC # BLD AUTO: 4.44 10E12/L (ref 3.8–5.2)
SPECIMEN EXP DATE BLD: NORMAL
WBC # BLD AUTO: 7.2 10E9/L (ref 4–11)

## 2018-12-11 PROCEDURE — 82306 VITAMIN D 25 HYDROXY: CPT | Performed by: ADVANCED PRACTICE MIDWIFE

## 2018-12-11 PROCEDURE — 86901 BLOOD TYPING SEROLOGIC RH(D): CPT | Performed by: ADVANCED PRACTICE MIDWIFE

## 2018-12-11 PROCEDURE — 86706 HEP B SURFACE ANTIBODY: CPT | Performed by: ADVANCED PRACTICE MIDWIFE

## 2018-12-11 PROCEDURE — 87340 HEPATITIS B SURFACE AG IA: CPT | Performed by: ADVANCED PRACTICE MIDWIFE

## 2018-12-11 PROCEDURE — 87389 HIV-1 AG W/HIV-1&-2 AB AG IA: CPT | Performed by: ADVANCED PRACTICE MIDWIFE

## 2018-12-11 PROCEDURE — 76817 TRANSVAGINAL US OBSTETRIC: CPT

## 2018-12-11 PROCEDURE — 87086 URINE CULTURE/COLONY COUNT: CPT | Performed by: ADVANCED PRACTICE MIDWIFE

## 2018-12-11 PROCEDURE — 86762 RUBELLA ANTIBODY: CPT | Performed by: ADVANCED PRACTICE MIDWIFE

## 2018-12-11 PROCEDURE — 85025 COMPLETE CBC W/AUTO DIFF WBC: CPT | Performed by: ADVANCED PRACTICE MIDWIFE

## 2018-12-11 PROCEDURE — 36415 COLL VENOUS BLD VENIPUNCTURE: CPT | Performed by: ADVANCED PRACTICE MIDWIFE

## 2018-12-11 PROCEDURE — 86900 BLOOD TYPING SEROLOGIC ABO: CPT | Performed by: ADVANCED PRACTICE MIDWIFE

## 2018-12-11 PROCEDURE — 86850 RBC ANTIBODY SCREEN: CPT | Performed by: ADVANCED PRACTICE MIDWIFE

## 2018-12-11 PROCEDURE — G0463 HOSPITAL OUTPT CLINIC VISIT: HCPCS | Mod: ZF,25

## 2018-12-11 PROCEDURE — 86780 TREPONEMA PALLIDUM: CPT | Performed by: ADVANCED PRACTICE MIDWIFE

## 2018-12-11 SDOH — HEALTH STABILITY: MENTAL HEALTH: HOW OFTEN DO YOU HAVE A DRINK CONTAINING ALCOHOL?: NEVER

## 2018-12-11 ASSESSMENT — PAIN SCALES - GENERAL: PAINLEVEL: NO PAIN (0)

## 2018-12-11 ASSESSMENT — MIFFLIN-ST. JEOR: SCORE: 1488.33

## 2018-12-11 NOTE — LETTER
2018       RE: Tiana Soto  [address] [address]     Dear Colleague,    Thank you for referring your patient, Tiana Soto, to the [facility] at [facility]. Please see a copy of my visit note below.    [facility] OB Intake note  Subjective   33 year old woman presents to clinic for initiation of OB care.  Patient's last menstrual period was 10/13/2018 (exact date).  at 8w3d by Estimated Date of Delivery: 2019 based on LMP.  Reviewed dating ultrasound. Pregnancy is planned, trying for 1.5 years.      - Symptoms since LMP include nausea and fatigue.  Patient has tried these relief measures: diet modification and increased rest.  - Genetic/Infection questionnaire completed, risks include FOB with cold sores.  Have you traveled during the pregnancy?Yes, If yes explain: [place]  Have your sexual partner(s) travelled during the pregnancy?Yes, If yes explain:  [place]    - Current Medications   Current Outpatient Medications   Medication Sig Dispense Refill     Prenatal MV-Min-Fe Fum-FA-DHA (PRENATAL+DHA PO) Take 2 tablets by mouth daily       Prenatal Vit-Fe Fumarate-FA (PRENATAL MULTIVITAMIN  PLUS IRON) 27-0.8 MG TABS per tablet Take 4 tablets by mouth daily            - Co-morbids   Past Medical History:   Diagnosis Date     Depressive disorder     Treated for depression in adolescence, not since then.     Tobacco use disorder     Stopped prior to pregnancy     - Risk for GDM -  No risk identified  - The patient does not h/o Pre Eclampsia, Current multi fetal gestation, Pre Gestational Diabetes (Type 1 or Type 2), chronic hypertension, renal disease, Autoimmune disease (systematic lupus erythematosus, antiphospholipid syndrome) so WILL NOT start low dose aspirin (81mg) starting between 12 and 28 weeks to prevent preeclampsia.  - The patient  does not have a history of spontaneous  birth so  WILL NOT consider progesterone starting  at 16-20 weeks and/or serial transvaginal cervical length ultrasounds from 16-24 weeks.    PERSONAL/SOCIAL HISTORY  Lives with partner. FOB, Harris, involved and supportive. Together x 6 years, met at work in restaurant industry.  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  Her job involves light activity .  Additional items: None    Objective:  -VS: reviewed and within normal limits   -General appearance: no acute distress, patient is comfortable   NEUROLOGICAL/PSYCHIATRIC   - Orientated x 3   -Mood and affect: normal     Assessment/Plan:  33 year old  8w3d weeks of pregnancy with BILL of 2019 by LMP of Patient's last menstrual period was 10/13/2018 (exact date). Ultrasound confirms.      Orders Placed This Encounter   Procedures     25- OH-Vitamin D     CBC with Platelets Differential     Hepatitis B Surface Antigen     HIV Antigen Antibody Combo     Rubella Antibody IgG Quantitative     Treponema Abs w Reflex to RPR and Titer     Hepatitis B Surface Antibody     ABO/Rh Type and Screen     - Oriented to Practice, types of care, and how to reach a provider.  Pt prefers CNM.  - Patient received 1st trimester new OB education packet complete with aide of [facility] booklet including information on genetic screening test options.    - Patient would like to disucss options further at new OB visit.  - Patient was encouraged to start prenatal vitamins as tolerated.    - Patient was sent to lab for routine OB labs.     - Pregnancy concerns to be addressed by provider at new OB exam include: pap up to date, genetic screening options.  - Pt to RTO for NOB visit in 2-3 weeks and prn if questions or concerns    Again, thank you for allowing me to participate in the care of your patient.      Sincerely,    DAT Shukla CNM

## 2018-12-11 NOTE — PROGRESS NOTES
[facility] OB Intake note  Subjective   33 year old woman presents to clinic for initiation of OB care.  Patient's last menstrual period was 10/13/2018 (exact date).  at 8w3d by Estimated Date of Delivery: 2019 based on LMP.  Reviewed dating ultrasound. Pregnancy is planned, trying for 1.5 years.      - Symptoms since LMP include nausea and fatigue.  Patient has tried these relief measures: diet modification and increased rest.  - Genetic/Infection questionnaire completed, risks include FOB with cold sores.  Have you traveled during the pregnancy?Yes, If yes explain: [place]  Have your sexual partner(s) travelled during the pregnancy?Yes, If yes explain:  [place]    - Current Medications   Current Outpatient Medications   Medication Sig Dispense Refill     Prenatal MV-Min-Fe Fum-FA-DHA (PRENATAL+DHA PO) Take 2 tablets by mouth daily       Prenatal Vit-Fe Fumarate-FA (PRENATAL MULTIVITAMIN  PLUS IRON) 27-0.8 MG TABS per tablet Take 4 tablets by mouth daily            - Co-morbids   Past Medical History:   Diagnosis Date     Depressive disorder     Treated for depression in adolescence, not since then.     Tobacco use disorder     Stopped prior to pregnancy     - Risk for GDM -  No risk identified  - The patient does not h/o Pre Eclampsia, Current multi fetal gestation, Pre Gestational Diabetes (Type 1 or Type 2), chronic hypertension, renal disease, Autoimmune disease (systematic lupus erythematosus, antiphospholipid syndrome) so WILL NOT start low dose aspirin (81mg) starting between 12 and 28 weeks to prevent preeclampsia.  - The patient  does not have a history of spontaneous  birth so  WILL NOT consider progesterone starting at 16-20 weeks and/or serial transvaginal cervical length ultrasounds from 16-24 weeks.    PERSONAL/SOCIAL HISTORY  Lives with partner. DARIUSZ, Harris, involved and supportive. Together x 6 years, met at work in restaurant industry.  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   Her job involves light activity .  Additional items: None    Objective:  -VS: reviewed and within normal limits   -General appearance: no acute distress, patient is comfortable   NEUROLOGICAL/PSYCHIATRIC   - Orientated x 3   -Mood and affect: normal     Assessment/Plan:  33 year old  8w3d weeks of pregnancy with BILL of 2019 by LMP of Patient's last menstrual period was 10/13/2018 (exact date). Ultrasound confirms.      Orders Placed This Encounter   Procedures     25- OH-Vitamin D     CBC with Platelets Differential     Hepatitis B Surface Antigen     HIV Antigen Antibody Combo     Rubella Antibody IgG Quantitative     Treponema Abs w Reflex to RPR and Titer     Hepatitis B Surface Antibody     ABO/Rh Type and Screen     - Oriented to Practice, types of care, and how to reach a provider.  Pt prefers CNM.  - Patient received 1st trimester new OB education packet complete with aide of [facility] booklet including information on genetic screening test options.    - Patient would like to disucss options further at new OB visit.  - Patient was encouraged to start prenatal vitamins as tolerated.    - Patient was sent to lab for routine OB labs.     - Pregnancy concerns to be addressed by provider at new OB exam include: pap up to date, genetic screening options.  - Pt to RTO for NOB visit in 2-3 weeks and prn if questions or concerns

## 2018-12-12 LAB
BACTERIA SPEC CULT: NO GROWTH
Lab: NORMAL
RUBV IGG SERPL IA-ACNC: 17 IU/ML
SPECIMEN SOURCE: NORMAL
T PALLIDUM AB SER QL: NONREACTIVE

## 2018-12-27 NOTE — PROGRESS NOTES
SUBJECTIVE:   Tiana is a 33 year old female who presents to clinic for a new OB visit.   at 10w5d with Estimated Date of Delivery: 2019 based on LMP. Feels well. Has started PNV.     She has not had bleeding since her LMP.   She has had nausea. Weight loss has not occurred, although her first recorded weight was high per her as she had just returned from a vacation.   This was a planned pregnancy.   FOB is involved,  Harris.   OTHER CONCERNS: Harris is 53 and they are wondering if that could potentially be an issue in planning future pregnancies. They feel strongly that they would not change plans for this pg in the event there was a genetic issue and are leaning towards not doing genetic testing. Would also like to discuss normal weight gain for her BMI - is concerned about this becoming an issue as she is sensitive about her weight.     ===========================================  ROS: 10 point ROS neg other than the symptoms noted above in the HPI.    PSYCHIATRIC:  Denies mood changes  PHQ-9 score:    PHQ-9 SCORE 2011   PHQ-9 Total Score 6     No flowsheet data found.    Past History:  Her past medical history   Past Medical History:   Diagnosis Date     Depressive disorder     Treated for depression in adolescence, not since then.     Tobacco use disorder     Stopped prior to pregnancy     This is her first pregnancy  She quit smoking over 2 years ago.     HISTORY:  Family History   Problem Relation Age of Onset     Depression Father      Cancer Other         uncle- esophageal cancer, aunt- leukemia     Diabetes No family hx of      Social History     Socioeconomic History     Marital status: Single     Spouse name: Partner-Harris      Number of children: 0     Years of education: Not on file     Highest education level: Not on file   Social Needs     Financial resource strain: Not on file     Food insecurity - worry: Not on file     Food insecurity - inability: Not on file     Transportation  needs - medical: Not on file     Transportation needs - non-medical: Not on file   Occupational History     Occupation:  self-employed     Comment: manufactures bone broth   Tobacco Use     Smoking status: Former Smoker     Years: 14.00     Types: Cigarettes     Start date: 2002     Last attempt to quit: 2016     Years since quittin.3     Smokeless tobacco: Never Used     Tobacco comment: 1-2 cigarettes a day   Substance and Sexual Activity     Alcohol use: No     Frequency: Never     Comment: stopped when found out pregnant     Drug use: No     Sexual activity: Yes     Partners: Male   Other Topics Concern      Service No     Blood Transfusions No     Caffeine Concern Not Asked     Comment: 1 large cup of coffee every other day.     Occupational Exposure Not Asked     Hobby Hazards Not Asked     Sleep Concern Not Asked     Stress Concern Not Asked     Weight Concern Not Asked     Special Diet Not Asked     Back Care Not Asked     Exercise Yes     Comment: swimming, and walking     Bike Helmet Not Asked     Seat Belt Yes     Self-Exams Yes     Parent/sibling w/ CABG, MI or angioplasty before 65F 55M? No   Social History Narrative    How much exercise per week? Swimming 3 miles wk, walking dogs-recently decreased.     How much calcium per day? supplement       How much caffeine per day? none    How much vitamin D per day? supplement    Do you/your family wear seatbelts?  Yes    Do you/your family use safety helmets? Yes    Do you/your family use sunscreen? sometimes    Do you/your family keep firearms in the home? No    Do you/your family have a smoke detector(s)? Yes        2018 Lizet Anderson LPN         Current Outpatient Medications   Medication Sig     Prenatal MV-Min-Fe Fum-FA-DHA (PRENATAL+DHA PO) Take 2 tablets by mouth daily     Prenatal Vit-Fe Fumarate-FA (PRENATAL MULTIVITAMIN  PLUS IRON) 27-0.8 MG TABS per tablet Take 4 tablets by mouth daily      No current  "facility-administered medications for this visit.      No Known Allergies    ============================================  MEDICAL HISTORY  Past Medical History:   Diagnosis Date     Depressive disorder     Treated for depression in adolescence, not since then.     Tobacco use disorder     Stopped prior to pregnancy     Past Surgical History:   Procedure Laterality Date     NO HISTORY OF SURGERY         Obstetric History       T0      L0     SAB0   TAB0   Ectopic0   Multiple0   Live Births0       # Outcome Date GA Lbr Javier/2nd Weight Sex Delivery Anes PTL Lv   1 Current                 GYN History- Abnormal Pap Smears:no                        Cervical procedures: no                        History of STI: no    I personally reviewed the past social/family/medical and surgical history on the date of service.   I reviewed lab work done at Intake visit with patient.    EXAM:  /69   Pulse 61   Ht 1.676 m (5' 5.98\")   Wt 76.8 kg (169 lb 4.8 oz)   LMP 10/13/2018 (Exact Date)   BMI 27.34 kg/m     EXAM:  GENERAL:  Pleasant pregnant female, alert, cooperative and well groomed.  SKIN:  Warm and dry, without lesions or rashes  HEAD: Symmetrical features.  MOUTH:  Buccal mucosa pink, moist without lesions.  Teeth in good repair.    NECK:  Thyroid without enlargement and nodules.  Lymph nodes not palpable.  LUNGS:  Clear to auscultation.  BREAST:    No dominant, fixed or suspicious masses are noted.  No skin or nipple changes or axillary nodes.   Nipples everted.      HEART:  RRR without murmur.  ABDOMEN: Soft without masses , tenderness or organomegaly.  No CVA tenderness.  Uterus palpable at size equal to dates.  No scars noted. Fetal heart tones present.  MUSCULOSKELETAL:  Full range of motion  EXTREMITIES:  No edema. No significant varicosities.   PELVIC EXAM:  GENITALIA: EGBUS  External genitalia, Bartholin's glands, urethra & [place]'s glands:normal. Vulva reveals no erythema or lesions.       "   VAGINA:  pink, normal rugae and discharge, no lesions, good tone.   CERVIX:  smooth, without discharge or CMT.                UTERUS: Retroverted,  nontender at 10+6 week size.   ADNEXA:  Without masses or tenderness.   RECTAL:  Normal appearance.  Digital exam deferred.  WET PREP:Not done  GC/CHLAMYDIA CULTURE OBTAINED:YES    Lab Results   Component Value Date    PAP NIL 2016      ASSESSMENT:  33 year old , 10w5d weeks of pregnancy with BILL of 2019 by US confirms  Intrauterine pregnancy 10w5d size is consistent with dates  Genetic Screening: Level 2 Ultrasound only    ICD-10-CM    1. Encounter for supervision of normal first pregnancy in first trimester Z34.01 Chlamydia trachomatis PCR (Clinic Collect)     Neisseria gonorrhoeae PCR     PLAN:  - Reviewed use of triage nurse line and contacting the on-call provider after hours for an urgent need such as fever, vagina bleeding, bladder or vaginal infection, rupture of membranes,  or term labor.    - Reviewed best evidence for: weight gain for her weight and height for pregnancy:  Based on pre-pregnancy weight of 175 and Body mass index is 27.34 kg/m . RECOMMENDED WEIGHT GAIN: 15-25 lbs. Discussed healthy pregnancy nutrition and weight gain at length per pt request.  -Will only do the comprehensive US - no other genetic screening.   - Reviewed healthy diet and foods to avoid; exercise and activity during pregnancy; avoiding exposure to toxoplasmosis; and maintenance of a generally healthy lifestyle.   - Discussed the harms, benefits, side effects and alternative therapies for current prescribed and OTC medications.  - All pt's and partner's questions discussed and answered.  Pt verbalized understanding of and agreement to plan of care.   - Continue scheduled prenatal care and prn if questions or concerns.    Namrata Castellanos CNM

## 2018-12-28 ENCOUNTER — OFFICE VISIT (OUTPATIENT)
Dept: OBGYN | Facility: CLINIC | Age: 33
End: 2018-12-28
Attending: ADVANCED PRACTICE MIDWIFE
Payer: COMMERCIAL

## 2018-12-28 VITALS
WEIGHT: 169.3 LBS | BODY MASS INDEX: 27.21 KG/M2 | SYSTOLIC BLOOD PRESSURE: 106 MMHG | DIASTOLIC BLOOD PRESSURE: 69 MMHG | HEIGHT: 66 IN | HEART RATE: 61 BPM

## 2018-12-28 DIAGNOSIS — Z34.01 ENCOUNTER FOR SUPERVISION OF NORMAL FIRST PREGNANCY IN FIRST TRIMESTER: Primary | ICD-10-CM

## 2018-12-28 PROCEDURE — 87591 N.GONORRHOEAE DNA AMP PROB: CPT | Performed by: ADVANCED PRACTICE MIDWIFE

## 2018-12-28 PROCEDURE — G0463 HOSPITAL OUTPT CLINIC VISIT: HCPCS | Mod: ZF

## 2018-12-28 PROCEDURE — 87491 CHLMYD TRACH DNA AMP PROBE: CPT | Performed by: ADVANCED PRACTICE MIDWIFE

## 2018-12-28 ASSESSMENT — ANXIETY QUESTIONNAIRES
GAD7 TOTAL SCORE: 0
6. BECOMING EASILY ANNOYED OR IRRITABLE: NOT AT ALL
7. FEELING AFRAID AS IF SOMETHING AWFUL MIGHT HAPPEN: NOT AT ALL
IF YOU CHECKED OFF ANY PROBLEMS ON THIS QUESTIONNAIRE, HOW DIFFICULT HAVE THESE PROBLEMS MADE IT FOR YOU TO DO YOUR WORK, TAKE CARE OF THINGS AT HOME, OR GET ALONG WITH OTHER PEOPLE: NOT DIFFICULT AT ALL
5. BEING SO RESTLESS THAT IT IS HARD TO SIT STILL: NOT AT ALL
1. FEELING NERVOUS, ANXIOUS, OR ON EDGE: NOT AT ALL
2. NOT BEING ABLE TO STOP OR CONTROL WORRYING: NOT AT ALL
3. WORRYING TOO MUCH ABOUT DIFFERENT THINGS: NOT AT ALL

## 2018-12-28 ASSESSMENT — MIFFLIN-ST. JEOR: SCORE: 1489.44

## 2018-12-28 ASSESSMENT — PATIENT HEALTH QUESTIONNAIRE - PHQ9
5. POOR APPETITE OR OVEREATING: NOT AT ALL
SUM OF ALL RESPONSES TO PHQ QUESTIONS 1-9: 0

## 2018-12-28 ASSESSMENT — PAIN SCALES - GENERAL: PAINLEVEL: NO PAIN (0)

## 2018-12-28 NOTE — LETTER
2018       RE: Tiana Soto  [address] [facility] [address]     Dear Colleague,    Thank you for referring your patient, Tiana Soto, to the [facility] at [facility]. Please see a copy of my visit note below.    SUBJECTIVE:   Tiana is a 33 year old female who presents to clinic for a new OB visit.   at 10w5d with Estimated Date of Delivery: 2019 based on LMP. Feels well. Has started PNV.     She has not had bleeding since her LMP.   She has had nausea. Weight loss has not occurred, although her first recorded weight was high per her as she had just returned from a vacation.   This was a planned pregnancy.   FOB is involved,  Harris.   OTHER CONCERNS: Harris is 53 and they are wondering if that could potentially be an issue in planning future pregnancies. They feel strongly that they would not change plans for this pg in the event there was a genetic issue and are leaning towards not doing genetic testing. Would also like to discuss normal weight gain for her BMI - is concerned about this becoming an issue as she is sensitive about her weight.     ===========================================  ROS: 10 point ROS neg other than the symptoms noted above in the HPI.    PSYCHIATRIC:  Denies mood changes  PHQ-9 score:    PHQ-9 SCORE 2011   PHQ-9 Total Score 6     No flowsheet data found.    Past History:  Her past medical history   Past Medical History:   Diagnosis Date     Depressive disorder     Treated for depression in adolescence, not since then.     Tobacco use disorder     Stopped prior to pregnancy     This is her first pregnancy  She quit smoking over 2 years ago.     HISTORY:  Family History   Problem Relation Age of Onset     Depression Father      Cancer Other         uncle- esophageal cancer, aunt- leukemia     Diabetes No family hx of      Social History     Socioeconomic History     Marital status: Single     Spouse name:  Partner-Harris      Number of children: 0     Years of education: Not on file     Highest education level: Not on file   Social Needs     Financial resource strain: Not on file     Food insecurity - worry: Not on file     Food insecurity - inability: Not on file     Transportation needs - medical: Not on file     Transportation needs - non-medical: Not on file   Occupational History     Occupation:  self-employed     Comment: manufactures bone broth   Tobacco Use     Smoking status: Former Smoker     Years: 14.00     Types: Cigarettes     Start date: 2002     Last attempt to quit: 2016     Years since quittin.3     Smokeless tobacco: Never Used     Tobacco comment: 1-2 cigarettes a day   Substance and Sexual Activity     Alcohol use: No     Frequency: Never     Comment: stopped when found out pregnant     Drug use: No     Sexual activity: Yes     Partners: Male   Other Topics Concern      Service No     Blood Transfusions No     Caffeine Concern Not Asked     Comment: 1 large cup of coffee every other day.     Occupational Exposure Not Asked     Hobby Hazards Not Asked     Sleep Concern Not Asked     Stress Concern Not Asked     Weight Concern Not Asked     Special Diet Not Asked     Back Care Not Asked     Exercise Yes     Comment: swimming, and walking     Bike Helmet Not Asked     Seat Belt Yes     Self-Exams Yes     Parent/sibling w/ CABG, MI or angioplasty before 65F 55M? No   Social History Narrative    How much exercise per week? Swimming 3 miles wk, walking dogs-recently decreased.     How much calcium per day? supplement       How much caffeine per day? none    How much vitamin D per day? supplement    Do you/your family wear seatbelts?  Yes    Do you/your family use safety helmets? Yes    Do you/your family use sunscreen? sometimes    Do you/your family keep firearms in the home? No    Do you/your family have a smoke detector(s)? Yes        2018 Lizet Anderson LPN      "    Current Outpatient Medications   Medication Sig     Prenatal MV-Min-Fe Fum-FA-DHA (PRENATAL+DHA PO) Take 2 tablets by mouth daily     Prenatal Vit-Fe Fumarate-FA (PRENATAL MULTIVITAMIN  PLUS IRON) 27-0.8 MG TABS per tablet Take 4 tablets by mouth daily      No current facility-administered medications for this visit.      No Known Allergies    ============================================  MEDICAL HISTORY  Past Medical History:   Diagnosis Date     Depressive disorder     Treated for depression in adolescence, not since then.     Tobacco use disorder     Stopped prior to pregnancy     Past Surgical History:   Procedure Laterality Date     NO HISTORY OF SURGERY         Obstetric History       T0      L0     SAB0   TAB0   Ectopic0   Multiple0   Live Births0       # Outcome Date GA Lbr Javier/2nd Weight Sex Delivery Anes PTL Lv   1 Current                 GYN History- Abnormal Pap Smears:no                        Cervical procedures: no                        History of STI: no    I personally reviewed the past social/family/medical and surgical history on the date of service.   I reviewed lab work done at Intake visit with patient.    EXAM:  /69   Pulse 61   Ht 1.676 m (5' 5.98\")   Wt 76.8 kg (169 lb 4.8 oz)   LMP 10/13/2018 (Exact Date)   BMI 27.34 kg/m      EXAM:  GENERAL:  Pleasant pregnant female, alert, cooperative and well groomed.  SKIN:  Warm and dry, without lesions or rashes  HEAD: Symmetrical features.  MOUTH:  Buccal mucosa pink, moist without lesions.  Teeth in good repair.    NECK:  Thyroid without enlargement and nodules.  Lymph nodes not palpable.  LUNGS:  Clear to auscultation.  BREAST:    No dominant, fixed or suspicious masses are noted.  No skin or nipple changes or axillary nodes.   Nipples everted.      HEART:  RRR without murmur.  ABDOMEN: Soft without masses , tenderness or organomegaly.  No CVA tenderness.  Uterus palpable at size equal to dates.  No scars noted. " Fetal heart tones present.  MUSCULOSKELETAL:  Full range of motion  EXTREMITIES:  No edema. No significant varicosities.   PELVIC EXAM:  GENITALIA: EGBUS  External genitalia, Bartholin's glands, urethra & [place]'s glands:normal. Vulva reveals no erythema or lesions.         VAGINA:  pink, normal rugae and discharge, no lesions, good tone.   CERVIX:  smooth, without discharge or CMT.                UTERUS: Retroverted,  nontender at 10+6 week size.   ADNEXA:  Without masses or tenderness.   RECTAL:  Normal appearance.  Digital exam deferred.  WET PREP:Not done  GC/CHLAMYDIA CULTURE OBTAINED:YES    Lab Results   Component Value Date    PAP NIL 2016      ASSESSMENT:  33 year old , 10w5d weeks of pregnancy with BILL of 2019 by US confirms  Intrauterine pregnancy 10w5d size is consistent with dates  Genetic Screening: Level 2 Ultrasound only    ICD-10-CM    1. Encounter for supervision of normal first pregnancy in first trimester Z34.01 Chlamydia trachomatis PCR (Clinic Collect)     Neisseria gonorrhoeae PCR     PLAN:  - Reviewed use of triage nurse line and contacting the on-call provider after hours for an urgent need such as fever, vagina bleeding, bladder or vaginal infection, rupture of membranes,  or term labor.    - Reviewed best evidence for: weight gain for her weight and height for pregnancy:  Based on pre-pregnancy weight of 175 and Body mass index is 27.34 kg/m . RECOMMENDED WEIGHT GAIN: 15-25 lbs. Discussed healthy pregnancy nutrition and weight gain at length per pt request.  -Will only do the comprehensive US - no other genetic screening.   - Reviewed healthy diet and foods to avoid; exercise and activity during pregnancy; avoiding exposure to toxoplasmosis; and maintenance of a generally healthy lifestyle.   - Discussed the harms, benefits, side effects and alternative therapies for current prescribed and OTC medications.  - All pt's and partner's questions discussed and answered.   Pt verbalized understanding of and agreement to plan of care.   - Continue scheduled prenatal care and prn if questions or concerns.    Namrata Castellanos CNM

## 2018-12-28 NOTE — NURSING NOTE
Chief Complaint   Patient presents with     Prenatal Care   nob exam   10.6 weeks nausea improved over the last couple of days.

## 2018-12-29 ASSESSMENT — ANXIETY QUESTIONNAIRES: GAD7 TOTAL SCORE: 0

## 2018-12-30 LAB
C TRACH DNA SPEC QL NAA+PROBE: NEGATIVE
N GONORRHOEA DNA SPEC QL NAA+PROBE: NEGATIVE
SPECIMEN SOURCE: NORMAL
SPECIMEN SOURCE: NORMAL

## 2019-01-25 ENCOUNTER — TRANSCRIBE ORDERS (OUTPATIENT)
Dept: MATERNAL FETAL MEDICINE | Facility: CLINIC | Age: 34
End: 2019-01-25

## 2019-01-25 ENCOUNTER — OFFICE VISIT (OUTPATIENT)
Dept: OBGYN | Facility: CLINIC | Age: 34
End: 2019-01-25
Attending: ADVANCED PRACTICE MIDWIFE
Payer: COMMERCIAL

## 2019-01-25 VITALS
BODY MASS INDEX: 28.31 KG/M2 | WEIGHT: 169.9 LBS | SYSTOLIC BLOOD PRESSURE: 121 MMHG | HEIGHT: 65 IN | DIASTOLIC BLOOD PRESSURE: 82 MMHG | HEART RATE: 61 BPM

## 2019-01-25 DIAGNOSIS — Z34.01 ENCOUNTER FOR SUPERVISION OF NORMAL FIRST PREGNANCY IN FIRST TRIMESTER: Primary | ICD-10-CM

## 2019-01-25 DIAGNOSIS — Z36.89 ENCOUNTER FOR FETAL ANATOMIC SURVEY: ICD-10-CM

## 2019-01-25 DIAGNOSIS — O26.90 PREGNANCY RELATED CONDITION, ANTEPARTUM: Primary | ICD-10-CM

## 2019-01-25 DIAGNOSIS — E55.9 VITAMIN D DEFICIENCY: ICD-10-CM

## 2019-01-25 PROBLEM — Z34.00 SUPERVISION OF NORMAL FIRST PREGNANCY: Status: ACTIVE | Noted: 2018-12-11

## 2019-01-25 PROCEDURE — G0463 HOSPITAL OUTPT CLINIC VISIT: HCPCS | Mod: ZF

## 2019-01-25 ASSESSMENT — MIFFLIN-ST. JEOR: SCORE: 1476.54

## 2019-01-25 NOTE — PROGRESS NOTES
"Subjective:      33 year old  at 14w6d presents for a routine prenatal appointment.         Denies cramping/contractions, vaginal bleeding, discharge or leakage of fluid. No fetal movement  Yet.  No HA, vision changes, ruq/epigastric pain.       Patient concerns: Feeling well overall. Does not desire genetic screening. Needs level 2 ultrasound scheduled. Nausea has improved significantly in the last week- feels \"so much better.\" Has increased energy, increased appetite.   Question about doing work around her house- using a soy based product to strip staircase banister.    Objective:  Vitals:    19 0935   BP: 121/82   Pulse: 61   Weight: 77.1 kg (169 lb 14.4 oz)   Height: 1.651 m (5' 5\")       See OB flowsheet    Assessment/Plan     Encounter Diagnosis   Name Primary?     Encounter for supervision of normal first pregnancy in first trimester Yes     Orders Placed This Encounter   Procedures     [facility] REFERRAL-PREGNANCY     - Reviewed total weight gain, encouraged continued healthy diet and exercise.      - Reviewed why/how to contact provider.      Patient education/orders or handouts today:  PTL signs/symptoms, Quad screen and level 2 u/s scheduled    Reviewed OBI labs.  Recommended daily vitamin d supplement of 5000 international unit(s)/day.  Declines quad screen.  M referral placed for level 2 ultrasound.     Continue scheduled prenatal care, RTC in 4 weeks and prn if questions or concerns.      DAT Mora, KEERTHI   "

## 2019-01-25 NOTE — LETTER
"  RE: Tiana Soto  [address] [place] [id number]     Dear Colleague,    Thank you for referring your patient, Tiana Soto, to the [facility] at [facility]. Please see a copy of my visit note below.    Subjective:      33 year old  at 14w6d presents for a routine prenatal appointment.         Denies cramping/contractions, vaginal bleeding, discharge or leakage of fluid. No fetal movement  Yet.  No HA, vision changes, ruq/epigastric pain.       Patient concerns: Feeling well overall. Does not desire genetic screening. Needs level 2 ultrasound scheduled. Nausea has improved significantly in the last week- feels \"so much better.\" Has increased energy, increased appetite.   Question about doing work around her house- using a soy based product to strip staircase banister.    Objective:  Vitals:    19 0935   BP: 121/82   Pulse: 61   Weight: 77.1 kg (169 lb 14.4 oz)   Height: 1.651 m (5' 5\")       See OB flowsheet    Assessment/Plan     Encounter Diagnosis   Name Primary?     Encounter for supervision of normal first pregnancy in first trimester Yes     Orders Placed This Encounter   Procedures     MAT FETAL MED CTR REFERRAL-PREGNANCY     - Reviewed total weight gain, encouraged continued healthy diet and exercise.      - Reviewed why/how to contact provider.      Patient education/orders or handouts today:  PTL signs/symptoms, Quad screen and level 2 u/s scheduled    Reviewed OBI labs.  Recommended daily vitamin d supplement of 5000 international unit(s)/day.  Declines quad screen.  MFM referral placed for level 2 ultrasound.     Continue scheduled prenatal care, RTC in 4 weeks and prn if questions or concerns.      DAT Mora, KEERTHI   "

## 2019-02-09 ENCOUNTER — TELEPHONE (OUTPATIENT)
Dept: OBGYN | Facility: CLINIC | Age: 34
End: 2019-02-09

## 2019-02-09 NOTE — TELEPHONE ENCOUNTER
JILLIAN from [facility] asking about changing the litter box of the cats she is currently cat-sitting. Informed her that she should avoid changing the litter box to avoid exposure to toxoplasmosis, she agrees to this and will have her boyfriend change the litter. She reports changing the litter box already twice. She agrees to let us know if she experiences any s/s of illness like fever, malaise,  Lymphadenopathy.    Namrata Castellanos CNM

## 2019-02-12 PROBLEM — Z00.6 RESEARCH STUDY PATIENT: Status: ACTIVE | Noted: 2019-02-12

## 2019-02-15 ENCOUNTER — PRE VISIT (OUTPATIENT)
Dept: MATERNAL FETAL MEDICINE | Facility: CLINIC | Age: 34
End: 2019-02-15

## 2019-02-18 ASSESSMENT — ANXIETY QUESTIONNAIRES
3. WORRYING TOO MUCH ABOUT DIFFERENT THINGS: NOT AT ALL
1. FEELING NERVOUS, ANXIOUS, OR ON EDGE: NOT AT ALL
7. FEELING AFRAID AS IF SOMETHING AWFUL MIGHT HAPPEN: NOT AT ALL
5. BEING SO RESTLESS THAT IT IS HARD TO SIT STILL: NOT AT ALL
GAD7 TOTAL SCORE: 0
6. BECOMING EASILY ANNOYED OR IRRITABLE: NOT AT ALL
4. TROUBLE RELAXING: NOT AT ALL
2. NOT BEING ABLE TO STOP OR CONTROL WORRYING: NOT AT ALL
GAD7 TOTAL SCORE: 0
7. FEELING AFRAID AS IF SOMETHING AWFUL MIGHT HAPPEN: NOT AT ALL

## 2019-02-19 ENCOUNTER — OFFICE VISIT (OUTPATIENT)
Dept: OBGYN | Facility: CLINIC | Age: 34
End: 2019-02-19
Attending: ADVANCED PRACTICE MIDWIFE
Payer: COMMERCIAL

## 2019-02-19 ENCOUNTER — HOSPITAL ENCOUNTER (OUTPATIENT)
Dept: ULTRASOUND IMAGING | Facility: CLINIC | Age: 34
Discharge: HOME | End: 2019-02-19
Attending: ADVANCED PRACTICE MIDWIFE | Admitting: ADVANCED PRACTICE MIDWIFE
Payer: COMMERCIAL

## 2019-02-19 ENCOUNTER — OFFICE VISIT (OUTPATIENT)
Dept: MATERNAL FETAL MEDICINE | Facility: CLINIC | Age: 34
End: 2019-02-19
Attending: ADVANCED PRACTICE MIDWIFE
Payer: COMMERCIAL

## 2019-02-19 VITALS
HEART RATE: 77 BPM | WEIGHT: 175.8 LBS | BODY MASS INDEX: 29.25 KG/M2 | SYSTOLIC BLOOD PRESSURE: 106 MMHG | DIASTOLIC BLOOD PRESSURE: 70 MMHG

## 2019-02-19 DIAGNOSIS — O26.90 PREGNANCY RELATED CONDITION, ANTEPARTUM: ICD-10-CM

## 2019-02-19 DIAGNOSIS — Z34.00 SUPERVISION OF NORMAL FIRST PREGNANCY, ANTEPARTUM: Primary | ICD-10-CM

## 2019-02-19 DIAGNOSIS — Z36.89 ENCOUNTER FOR FETAL ANATOMIC SURVEY: Primary | ICD-10-CM

## 2019-02-19 PROBLEM — M54.42 LEFT-SIDED LOW BACK PAIN WITH LEFT-SIDED SCIATICA: Status: RESOLVED | Noted: 2017-04-03 | Resolved: 2019-02-19

## 2019-02-19 PROCEDURE — 76811 OB US DETAILED SNGL FETUS: CPT

## 2019-02-19 PROCEDURE — G0463 HOSPITAL OUTPT CLINIC VISIT: HCPCS | Mod: 25

## 2019-02-19 PROCEDURE — 76805 OB US >/= 14 WKS SNGL FETUS: CPT

## 2019-02-19 ASSESSMENT — PAIN SCALES - GENERAL: PAINLEVEL: NO PAIN (0)

## 2019-02-19 ASSESSMENT — ANXIETY QUESTIONNAIRES: GAD7 TOTAL SCORE: 0

## 2019-02-19 NOTE — NURSING NOTE
Chief Complaint   Patient presents with     Prenatal Care     18 weeks 3 days      Health Maintenance Due   Topic Date Due     MATERNAL SCREENING  01/26/2019     PAP Q3 YR  05/02/2019     Mellisa Murphy CMA on 2/19/2019 at 9:44 AM

## 2019-02-19 NOTE — PROGRESS NOTES
Subjective:     33 year old  at 18w3d presents for a routine prenatal appointment.      Denies vaginal bleeding or leakage of fluid.  Denies contractions or cramping. Slight fetal movement.       No HA, visual changes, RUQ or epigastric pain.   The patient presents with the following concerns:     - taking a trip, leaving tomorrow for [place] to visit sister   - questions re: possible toxoplasmosis exposure, cat-sitting and changed litter couple times   - resumed drinking coffee, concerns about how much   - has started attending [employer] league meetings  Level II US  Scheduled.   Previously declined QS.      Objective:  Vitals:    19 0944   BP: 106/70   BP Location: Left arm   Patient Position: Sitting   Cuff Size: Adult Large   Pulse: 77   Weight: 79.7 kg (175 lb 12.8 oz)   See OB flowsheet    Assessment/Plan:   Encounter Diagnosis   Name Primary?     Supervision of normal first pregnancy, antepartum Yes     - Reviewed total weight gain, encouraged continued healthy diet and exercise. Discussed limiting caffeine intake to 1-2 cups of coffee per day, or drinking decaf.   - Reviewed signs/symptoms of toxoplasmosis and recommendation for screening if symptomatic.   - Reviewed why/how to contact provider.   - Patient education/orders or handouts today: PTL signs/symptoms, Fetal movement and Level 2 u/s scheduled  - Return to clinic in 4 weeks and prn if questions or concerns.

## 2019-02-19 NOTE — PROGRESS NOTES
"Please see \"Imaging\" tab under \"Chart Review\" for details of today's ultrasound.    Alejandro Gayle M.D.  Specialist in Maternal-Fetal Medicine     "

## 2019-02-19 NOTE — LETTER
2019       RE: Tiana Soto  [address] [facility] [address]     Dear Colleague,    Thank you for referring your patient, Tiana Soto, to the [facility] at [facility]. Please see a copy of my visit note below.    Subjective:     33 year old  at 18w3d presents for a routine prenatal appointment.      Denies vaginal bleeding or leakage of fluid.  Denies contractions or cramping. Slight fetal movement.       No HA, visual changes, RUQ or epigastric pain.   The patient presents with the following concerns:     - taking a trip, leaving tomorrow for [place] to visit sister   - questions re: possible toxoplasmosis exposure, cat-sitting and changed litter couple times   - resumed drinking coffee, concerns about how much   - has started attending [employer] league meetings  Level II US  Scheduled.   Previously declined QS.      Objective:  Vitals:    19 0944   BP: 106/70   BP Location: Left arm   Patient Position: Sitting   Cuff Size: Adult Large   Pulse: 77   Weight: 79.7 kg (175 lb 12.8 oz)   See OB flowsheet    Assessment/Plan:   Encounter Diagnosis   Name Primary?     Supervision of normal first pregnancy, antepartum Yes     - Reviewed total weight gain, encouraged continued healthy diet and exercise. Discussed limiting caffeine intake to 1-2 cups of coffee per day, or drinking decaf.   - Reviewed signs/symptoms of toxoplasmosis and recommendation for screening if symptomatic.   - Reviewed why/how to contact provider.   - Patient education/orders or handouts today: PTL signs/symptoms, Fetal movement and Level 2 u/s scheduled  - Return to clinic in 4 weeks and prn if questions or concerns.     Again, thank you for allowing me to participate in the care of your patient.      Sincerely,    DAT Shukla CNM

## 2019-02-27 ENCOUNTER — TELEPHONE (OUTPATIENT)
Dept: OBGYN | Facility: CLINIC | Age: 34
End: 2019-02-27

## 2019-02-27 NOTE — TELEPHONE ENCOUNTER
Received call from Savana stating she is in [place] and travelling home today but has been struggling with a cold and cough (productive with dark yellow phlegm in the morning) and body aches. She tries to treat with homeopathic remedies so drank a lot of water and 'sour' foods last evening and then had vomiting. She is wondering what she should do. Denies fever/chills.    Advised to continue to hydrate but OK to take small sips of water very frequently or eat ice chips. Can use tylenol for aches, cough drops without zinc, delsym to control cough while traveling. Advised to monitor for fever and that if SOB or chest tightness or heaviness occur to go to ED.  She indicated understanding and agreed with plan.

## 2019-03-19 ENCOUNTER — OFFICE VISIT (OUTPATIENT)
Dept: OBGYN | Facility: CLINIC | Age: 34
End: 2019-03-19
Attending: ADVANCED PRACTICE MIDWIFE
Payer: COMMERCIAL

## 2019-03-19 VITALS
HEIGHT: 65 IN | BODY MASS INDEX: 29.52 KG/M2 | HEART RATE: 91 BPM | SYSTOLIC BLOOD PRESSURE: 117 MMHG | DIASTOLIC BLOOD PRESSURE: 74 MMHG | WEIGHT: 177.2 LBS

## 2019-03-19 DIAGNOSIS — Z34.02 ENCOUNTER FOR SUPERVISION OF NORMAL FIRST PREGNANCY IN SECOND TRIMESTER: Primary | ICD-10-CM

## 2019-03-19 PROCEDURE — G0463 HOSPITAL OUTPT CLINIC VISIT: HCPCS | Mod: ZF

## 2019-03-19 RX ORDER — CHLORAL HYDRATE 500 MG
2 CAPSULE ORAL DAILY
COMMUNITY
End: 2022-02-22

## 2019-03-19 ASSESSMENT — MIFFLIN-ST. JEOR: SCORE: 1509.65

## 2019-03-19 ASSESSMENT — PAIN SCALES - GENERAL: PAINLEVEL: NO PAIN (0)

## 2019-03-19 NOTE — PROGRESS NOTES
"Subjective:      33 year old  at 22w2d presents for a routine prenatal appointment.       Denies vaginal bleeding or leakage of fluid.  Denies contractions or cramping.    Endorses fetal movement.       No HA, visual changes, RUQ or epigastric pain.   The patient presents with the following concerns: was very ill for about a week after traveling to [place], has been better over the last week.    Going to [facility] meeting x last 3 months, is going to [employer] this weekend.     Level II US  Results reviewed normal scan.      Objective:  Vitals:    19 0949   BP: 117/74   Pulse: 91   Weight: 80.4 kg (177 lb 3.2 oz)   Height: 1.651 m (5' 5\")   See OB flowsheet    Assessment/Plan     Encounter Diagnosis   Name Primary?     Encounter for supervision of normal first pregnancy in second trimester Yes     No orders of the defined types were placed in this encounter.    Orders Placed This Encounter   Medications     fish oil-omega-3 fatty acids 1000 MG capsule     Sig: Take 2 g by mouth daily     cholecalciferol (VITAMIN D3) 5000 units TABS tablet     Sig: Take by mouth daily     - Applauded their great preparation, provided DARLENE and EDM pamphlet.   - Reviewed total weight gain, encouraged continued healthy diet and exercise.      - Reviewed why/how to contact provider.    Patient education/orders or handouts today:  PTL signs/symptoms, Fetal movement and Plan for EOB visit w labs   Return to clinic in 5 weeks for EOB and prn if questions or concerns.     Namrata Castellanos CNM                "

## 2019-03-19 NOTE — LETTER
"  RE: Tiana Soto  [address] [place] [id number]     Dear Colleague,    Thank you for referring your patient, Tiana Soto, to the [facility] at [facility]. Please see a copy of my visit note below.    Subjective:      33 year old  at 22w2d presents for a routine prenatal appointment.       Denies vaginal bleeding or leakage of fluid.  Denies contractions or cramping.    Endorses fetal movement.       No HA, visual changes, RUQ or epigastric pain.   The patient presents with the following concerns: was very ill for about a week after traveling to [place], has been better over the last week.    Going to [facility] meeting x last 3 months, is going to [employer] this weekend.     Level II US  Results reviewed normal scan.      Objective:  Vitals:    19 0949   BP: 117/74   Pulse: 91   Weight: 80.4 kg (177 lb 3.2 oz)   Height: 1.651 m (5' 5\")   See OB flowsheet    Assessment/Plan     Encounter Diagnosis   Name Primary?     Encounter for supervision of normal first pregnancy in second trimester Yes     No orders of the defined types were placed in this encounter.    Orders Placed This Encounter   Medications     fish oil-omega-3 fatty acids 1000 MG capsule     Sig: Take 2 g by mouth daily     cholecalciferol (VITAMIN D3) 5000 units TABS tablet     Sig: Take by mouth daily     - Applauded their great preparation, provided DARLENE and EDM pamphlet.   - Reviewed total weight gain, encouraged continued healthy diet and exercise.      - Reviewed why/how to contact provider.    Patient education/orders or handouts today:  PTL signs/symptoms, Fetal movement and Plan for EOB visit w labs   Return to clinic in 5 weeks for EOB and prn if questions or concerns.     Again, thank you for allowing me to participate in the care of your patient.      Sincerely,    Namrata Castellanos CNM      "

## 2019-04-22 ENCOUNTER — OFFICE VISIT (OUTPATIENT)
Dept: OBGYN | Facility: CLINIC | Age: 34
End: 2019-04-22
Attending: MIDWIFE
Payer: COMMERCIAL

## 2019-04-22 VITALS
SYSTOLIC BLOOD PRESSURE: 119 MMHG | WEIGHT: 186.8 LBS | DIASTOLIC BLOOD PRESSURE: 78 MMHG | BODY MASS INDEX: 31.12 KG/M2 | HEART RATE: 74 BPM | HEIGHT: 65 IN

## 2019-04-22 DIAGNOSIS — Z34.02 ENCOUNTER FOR SUPERVISION OF NORMAL FIRST PREGNANCY IN SECOND TRIMESTER: Primary | ICD-10-CM

## 2019-04-22 LAB
BASOPHILS # BLD AUTO: 0 10E9/L (ref 0–0.2)
BASOPHILS NFR BLD AUTO: 0.4 %
DEPRECATED CALCIDIOL+CALCIFEROL SERPL-MC: 44 UG/L (ref 20–75)
DIFFERENTIAL METHOD BLD: ABNORMAL
EOSINOPHIL # BLD AUTO: 0.1 10E9/L (ref 0–0.7)
EOSINOPHIL NFR BLD AUTO: 1.2 %
ERYTHROCYTE [DISTWIDTH] IN BLOOD BY AUTOMATED COUNT: 12.7 % (ref 10–15)
GLUCOSE 1H P 50 G GLC PO SERPL-MCNC: 106 MG/DL (ref 60–129)
HCT VFR BLD AUTO: 34.6 % (ref 35–47)
HCV AB SERPL QL IA: NONREACTIVE
HGB BLD-MCNC: 11.4 G/DL (ref 11.7–15.7)
IMM GRANULOCYTES # BLD: 0.1 10E9/L (ref 0–0.4)
IMM GRANULOCYTES NFR BLD: 1.7 %
LYMPHOCYTES # BLD AUTO: 1.7 10E9/L (ref 0.8–5.3)
LYMPHOCYTES NFR BLD AUTO: 22.3 %
MCH RBC QN AUTO: 30.6 PG (ref 26.5–33)
MCHC RBC AUTO-ENTMCNC: 32.9 G/DL (ref 31.5–36.5)
MCV RBC AUTO: 93 FL (ref 78–100)
MONOCYTES # BLD AUTO: 0.4 10E9/L (ref 0–1.3)
MONOCYTES NFR BLD AUTO: 4.6 %
NEUTROPHILS # BLD AUTO: 5.4 10E9/L (ref 1.6–8.3)
NEUTROPHILS NFR BLD AUTO: 69.8 %
NRBC # BLD AUTO: 0 10*3/UL
NRBC BLD AUTO-RTO: 0 /100
PLATELET # BLD AUTO: 174 10E9/L (ref 150–450)
RBC # BLD AUTO: 3.72 10E12/L (ref 3.8–5.2)
WBC # BLD AUTO: 7.8 10E9/L (ref 4–11)

## 2019-04-22 PROCEDURE — 25000128 H RX IP 250 OP 636: Mod: ZF

## 2019-04-22 PROCEDURE — 82306 VITAMIN D 25 HYDROXY: CPT | Performed by: MIDWIFE

## 2019-04-22 PROCEDURE — 90715 TDAP VACCINE 7 YRS/> IM: CPT | Mod: ZF

## 2019-04-22 PROCEDURE — 86803 HEPATITIS C AB TEST: CPT | Performed by: MIDWIFE

## 2019-04-22 PROCEDURE — 36415 COLL VENOUS BLD VENIPUNCTURE: CPT | Performed by: MIDWIFE

## 2019-04-22 PROCEDURE — 90471 IMMUNIZATION ADMIN: CPT | Mod: ZF

## 2019-04-22 PROCEDURE — 85025 COMPLETE CBC W/AUTO DIFF WBC: CPT | Performed by: MIDWIFE

## 2019-04-22 PROCEDURE — 82950 GLUCOSE TEST: CPT | Performed by: MIDWIFE

## 2019-04-22 ASSESSMENT — MIFFLIN-ST. JEOR: SCORE: 1553.2

## 2019-04-22 NOTE — LETTER
RE: Tiana Soto  [address] [place] [id number]     Dear Colleague,    Thank you for referring your patient, Tiana Soto, to the [facility] at [facility]. Please see a copy of my visit note below.     33 year old, , 27w2d,   The patient presents with the following concerns: feeling pretty well better than earlier    PHQ-9 SCORE 2011   PHQ-9 Total Score 6 -   PHQ-9 Total Score - 0     Education completed today includes breast feeding, [facility] hand out , contraception, counting movements, signs of pre-term labor, when to present to birthplace, post partum depression, GBS, getting enough iron and labor induction.  Birth preferences reviewed: Un-Medicated open to nitrous  May consider water birth   Labor support:   and Partner   [place] Feeding plans :    Contraception planned:  unsure  Has had copper IUD   The following labs were ordered today:       GCT, CBC w platelets, Vitamin D and Anti-treponema  Water birth consent form was not given.  Blood type:   ABO   Date Value Ref Range Status   2018 A  Final     RH(D)   Date Value Ref Range Status   2018 Pos  Final     Antibody Screen   Date Value Ref Range Status   2018 Neg  Final   , Rhogam  was notgiven.  TDAP  wasgiven.  A/P:    Continue scheduled prenatal care    Again, thank you for allowing me to participate in the care of your patient.      Sincerely,    DAT Madrid CNM

## 2019-04-22 NOTE — PROGRESS NOTES
33 year old, , 27w2d,   The patient presents with the following concerns: feeling pretty well better than earlier    PHQ-9 SCORE 2011   PHQ-9 Total Score 6 -   PHQ-9 Total Score - 0     Education completed today includes breast feeding, [facility] hand out , contraception, counting movements, signs of pre-term labor, when to present to birthplace, post partum depression, GBS, getting enough iron and labor induction.  Birth preferences reviewed: Un-Medicated open to nitrous  May consider water birth   Labor support:   and Partner   [place] Feeding plans :    Contraception planned:  unsure  Has had copper IUD   The following labs were ordered today:       GCT, CBC w platelets, Vitamin D and Anti-treponema  Water birth consent form was not given.  Blood type:   ABO   Date Value Ref Range Status   2018 A  Final     RH(D)   Date Value Ref Range Status   2018 Pos  Final     Antibody Screen   Date Value Ref Range Status   2018 Neg  Final   , Rhogam  was notgiven.  TDAP  wasgiven.  A/P:    Continue scheduled prenatal care  DAT Madrid CNM

## 2019-05-22 ENCOUNTER — OFFICE VISIT (OUTPATIENT)
Dept: OBGYN | Facility: CLINIC | Age: 34
End: 2019-05-22
Attending: ADVANCED PRACTICE MIDWIFE
Payer: COMMERCIAL

## 2019-05-22 VITALS
SYSTOLIC BLOOD PRESSURE: 116 MMHG | BODY MASS INDEX: 31.56 KG/M2 | DIASTOLIC BLOOD PRESSURE: 76 MMHG | WEIGHT: 189.4 LBS | HEART RATE: 76 BPM | HEIGHT: 65 IN

## 2019-05-22 DIAGNOSIS — Z34.03 ENCOUNTER FOR SUPERVISION OF NORMAL FIRST PREGNANCY IN THIRD TRIMESTER: Primary | ICD-10-CM

## 2019-05-22 DIAGNOSIS — E55.9 VITAMIN D DEFICIENCY: ICD-10-CM

## 2019-05-22 DIAGNOSIS — Z00.6 RESEARCH STUDY PATIENT: ICD-10-CM

## 2019-05-22 DIAGNOSIS — Z86.59 HISTORY OF ANXIETY: ICD-10-CM

## 2019-05-22 ASSESSMENT — MIFFLIN-ST. JEOR: SCORE: 1564.99

## 2019-05-22 ASSESSMENT — PAIN SCALES - GENERAL: PAINLEVEL: NO PAIN (0)

## 2019-05-22 NOTE — PROGRESS NOTES
"Subjective:      33 year old  at 31w4d presentst for a routine prenatal appointment with partner.    no vaginal bleeding or leakage of fluid.  no contractions. + fetal movement.       No HA, visual changes, RUQ or epigastric pain.   Patient concerns:    Feeling well overall.    Reviewed TDAP Previously given     - Nitrous Oxide.  Labor at home.  Low intervention if possible. Having birthing partner.   Will have discussion around AMSTL.  Has tour scheduled 6/15/19 but interested in other locations including home birth and birth center. Read [facility]ella May and is concerned about hospital interventions      Objective:  Vitals:    19 0940   BP: 116/76   Pulse: 76   Weight: 85.9 kg (189 lb 6.4 oz)   Height: 1.651 m (5' 5\")   , see ob flowsheet  Assessment/Plan     Encounter Diagnoses   Name Primary?     Encounter for supervision of normal first pregnancy in third trimester Yes     Research study patient-Has mouthguard on L and D, please give it to patient when admitted to L and D      History of anxiety      Vitamin D deficiency        ABO   Date Value Ref Range Status   2018 A  Final     RH(D)   Date Value Ref Range Status   2018 Pos  Final     Antibody Screen   Date Value Ref Range Status   2018 Neg  Final   , Rhogam  was notgiven.    - Reviewed total weight gain, encouraged continued healthy diet and exercise.  .  Reviewed importance of daily fetal kick count and why/how to contact provider.    - Reviewed why/how to contact provider if headache/visual changes/RUQ or epigastric pain, decreased fetal movement, vaginal bleeding, leakage of fluid or more than 4 contractions in an hour.     Patient education/orders or handouts today:  PTL signs/symptoms and Hospital tour  Reviewed GBS screening at 35-36 wks.    - Spent majority of visit discussing birth expectations. Plans to bring list to next visits.  Will consider AMTSL.   Return to clinic in 2 weeks and prn if questions or concerns.     Bettie PAUL" Joshua, APRN CNM

## 2019-05-22 NOTE — LETTER
"2019       RE: Tiana Soto  [address] [facility] [address]     Dear Colleague,    Thank you for referring your patient, Tiana Soto, to the [facility] at [facility] Please see a copy of my visit note below.    Subjective:      33 year old  at 31w4d presentst for a routine prenatal appointment with partner.    no vaginal bleeding or leakage of fluid.  no contractions. + fetal movement.       No HA, visual changes, RUQ or epigastric pain.   Patient concerns:    Feeling well overall.    Reviewed TDAP Previously given     - Nitrous Oxide.  Labor at home.  Low intervention if possible. Having birthing partner.   Will have discussion around AMSTL.  Has tour scheduled 6/15/19 but interested in other locations including home birth and birth center. Read [place] May and is concerned about hospital interventions      Objective:  Vitals:    19 0940   BP: 116/76   Pulse: 76   Weight: 85.9 kg (189 lb 6.4 oz)   Height: 1.651 m (5' 5\")   , see ob flowsheet  Assessment/Plan     Encounter Diagnoses   Name Primary?     Encounter for supervision of normal first pregnancy in third trimester Yes     Research study patient-Has mouthguard on L and D, please give it to patient when admitted to L and D      History of anxiety      Vitamin D deficiency        ABO   Date Value Ref Range Status   2018 A  Final     RH(D)   Date Value Ref Range Status   2018 Pos  Final     Antibody Screen   Date Value Ref Range Status   2018 Neg  Final   , Rhogam  was notgiven.    - Reviewed total weight gain, encouraged continued healthy diet and exercise.  .  Reviewed importance of daily fetal kick count and why/how to contact provider.    - Reviewed why/how to contact provider if headache/visual changes/RUQ or epigastric pain, decreased fetal movement, vaginal bleeding, leakage of fluid or more than 4 contractions in an hour.     Patient education/orders or handouts " today:  PTL signs/symptoms and Hospital tour  Reviewed GBS screening at 35-36 wks.    - Spent majority of visit discussing birth expectations. Plans to bring list to next visits.  Will consider AMTSL.   Return to clinic in 2 weeks and prn if questions or concerns.     DAT RodriguezM

## 2019-06-05 ENCOUNTER — OFFICE VISIT (OUTPATIENT)
Dept: OBGYN | Facility: CLINIC | Age: 34
End: 2019-06-05
Attending: ADVANCED PRACTICE MIDWIFE
Payer: COMMERCIAL

## 2019-06-05 VITALS
SYSTOLIC BLOOD PRESSURE: 109 MMHG | HEIGHT: 65 IN | BODY MASS INDEX: 31.72 KG/M2 | WEIGHT: 190.4 LBS | HEART RATE: 86 BPM | DIASTOLIC BLOOD PRESSURE: 69 MMHG

## 2019-06-05 DIAGNOSIS — Z00.6 RESEARCH STUDY PATIENT: ICD-10-CM

## 2019-06-05 DIAGNOSIS — Z34.03 ENCOUNTER FOR SUPERVISION OF NORMAL FIRST PREGNANCY IN THIRD TRIMESTER: Primary | ICD-10-CM

## 2019-06-05 DIAGNOSIS — Z86.59 HISTORY OF ANXIETY: ICD-10-CM

## 2019-06-05 PROCEDURE — G0463 HOSPITAL OUTPT CLINIC VISIT: HCPCS | Mod: ZF

## 2019-06-05 ASSESSMENT — PAIN SCALES - GENERAL: PAINLEVEL: NO PAIN (0)

## 2019-06-05 ASSESSMENT — MIFFLIN-ST. JEOR: SCORE: 1564.53

## 2019-06-05 NOTE — LETTER
"2019       RE: Tiana Soto  [address] [facility] [address]     Dear Colleague,    Thank you for referring your patient, Tiana Soto, to the [facility] at [facility]. Please see a copy of my visit note below.    Subjective:      34 year old  at 33w4d presentst for a routine prenatal appointment with partner.    no vaginal bleeding or leakage of fluid.  no contractions. + fetal movement.       No HA, visual changes, RUQ or epigastric pain.   Patient concerns:    Feeling well overall.  Reviewed EOB labs with patient.  Reviewed TDAP Previously given     - Needs paternity paperwork - is notary available postpartum?  - watebir consent signed, sent for scanning    Objective:  Vitals:    19 1006   BP: 109/69   Pulse: 86   Weight: 86.4 kg (190 lb 6.4 oz)   Height: 1.651 m (5' 5\")   , see ob flowsheet  Assessment/Plan     Encounter Diagnoses   Name Primary?     Encounter for supervision of normal first pregnancy in third trimester Yes     Research study patient-Has mouthguard on L and D, please give it to patient when admitted to L and D      History of anxiety        ABO   Date Value Ref Range Status   2018 A  Final     RH(D)   Date Value Ref Range Status   2018 Pos  Final     Antibody Screen   Date Value Ref Range Status   2018 Neg  Final   , Rhogam  was notgiven.    - Reviewed total weight gain, encouraged continued healthy diet and exercise.  .  Reviewed importance of daily fetal kick count and why/how to contact provider.    - Reviewed why/how to contact provider if headache/visual changes/RUQ or epigastric pain, decreased fetal movement, vaginal bleeding, leakage of fluid or more than 4 contractions in an hour.     Patient education/orders or handouts today:  PTL signs/symptoms  Reviewed GBS screening at 35-36 wks.    Return to clinic in 2 weeks and prn if questions or concerns.     DAT Rodriguez CNM    "

## 2019-06-08 PROBLEM — Z34.03 ENCOUNTER FOR SUPERVISION OF NORMAL FIRST PREGNANCY IN THIRD TRIMESTER: Status: ACTIVE | Noted: 2018-12-11

## 2019-06-20 ENCOUNTER — OFFICE VISIT (OUTPATIENT)
Dept: OBGYN | Facility: CLINIC | Age: 34
End: 2019-06-20
Attending: ADVANCED PRACTICE MIDWIFE
Payer: COMMERCIAL

## 2019-06-20 VITALS
DIASTOLIC BLOOD PRESSURE: 71 MMHG | WEIGHT: 194 LBS | SYSTOLIC BLOOD PRESSURE: 104 MMHG | HEIGHT: 65 IN | HEART RATE: 76 BPM | BODY MASS INDEX: 32.32 KG/M2

## 2019-06-20 DIAGNOSIS — Z34.03 ENCOUNTER FOR SUPERVISION OF NORMAL FIRST PREGNANCY IN THIRD TRIMESTER: ICD-10-CM

## 2019-06-20 DIAGNOSIS — E55.9 VITAMIN D DEFICIENCY: ICD-10-CM

## 2019-06-20 DIAGNOSIS — Z86.59 HISTORY OF ANXIETY: Primary | ICD-10-CM

## 2019-06-20 PROCEDURE — G0463 HOSPITAL OUTPT CLINIC VISIT: HCPCS | Mod: ZF

## 2019-06-20 ASSESSMENT — MIFFLIN-ST. JEOR: SCORE: 1580.86

## 2019-06-20 NOTE — PROGRESS NOTES
"Subjective:      34 year old  at 35w5d presentst for a routine prenatal appointment.    no vaginal bleeding or leakage of fluid.  no contractions. pos fetal movement.       No HA, visual changes, RUQ or epigastric pain.   Patient concerns:    Feeling well overall. questions about circ if boy, doing birth plan--will send in mychart and discuss next visit  Reviewed EOB labs with patient.  Reviewed TDAP Previously given  Objective:  Vitals:    19 1036   BP: 104/71   BP Location: Left arm   Patient Position: Chair   Pulse: 76   Weight: 88 kg (194 lb)   Height: 1.651 m (5' 5\")   , see ob flowsheet  Assessment/Plan     Encounter Diagnoses   Name Primary?     Encounter for supervision of normal first pregnancy in third trimester - [facility] CNM      History of anxiety Yes     Vitamin D deficiency      No orders of the defined types were placed in this encounter.    No orders of the defined types were placed in this encounter.    ABO   Date Value Ref Range Status   2018 A  Final     RH(D)   Date Value Ref Range Status   2018 Pos  Final     Antibody Screen   Date Value Ref Range Status   2018 Neg  Final   , Rhogam  was notgiven.    - Reviewed total weight gain, encouraged continued healthy diet and exercise.  .  Reviewed importance of daily fetal kick count and why/how to contact provider.    - Reviewed why/how to contact provider if headache/visual changes/RUQ or epigastric pain, decreased fetal movement, vaginal bleeding, leakage of fluid or more than 4 contractions in an hour.     Patient education/orders or handouts today:  Hospital tour and Childbirth Ed classes  Reviewed GBS screening at 35-36 wks.    Return to clinic in 1 weeks and prn if questions or concerns.     Susie Heck, DAT CNM      "

## 2019-06-20 NOTE — LETTER
"2019       RE: Tiana Soto  [address] [facility] [address]     Dear Colleague,    Thank you for referring your patient, Tiana Soto, to the [facility] at [facility]. Please see a copy of my visit note below.    Subjective:      34 year old  at 35w5d presentst for a routine prenatal appointment.    no vaginal bleeding or leakage of fluid.  no contractions. pos fetal movement.       No HA, visual changes, RUQ or epigastric pain.   Patient concerns:    Feeling well overall. questions about circ if boy, doing birth plan--will send in mychart and discuss next visit  Reviewed EOB labs with patient.  Reviewed TDAP Previously given  Objective:  Vitals:    19 1036   BP: 104/71   BP Location: Left arm   Patient Position: Chair   Pulse: 76   Weight: 88 kg (194 lb)   Height: 1.651 m (5' 5\")   , see ob flowsheet  Assessment/Plan     Encounter Diagnoses   Name Primary?     Encounter for supervision of normal first pregnancy in third trimester - [facility] CNM      History of anxiety Yes     Vitamin D deficiency      No orders of the defined types were placed in this encounter.    No orders of the defined types were placed in this encounter.    ABO   Date Value Ref Range Status   2018 A  Final     RH(D)   Date Value Ref Range Status   2018 Pos  Final     Antibody Screen   Date Value Ref Range Status   2018 Neg  Final   , Rhogam  was notgiven.    - Reviewed total weight gain, encouraged continued healthy diet and exercise.  .  Reviewed importance of daily fetal kick count and why/how to contact provider.    - Reviewed why/how to contact provider if headache/visual changes/RUQ or epigastric pain, decreased fetal movement, vaginal bleeding, leakage of fluid or more than 4 contractions in an hour.     Patient education/orders or handouts today:  Hospital tour and Childbirth Ed classes  Reviewed GBS screening at 35-36 wks.    Return to clinic in 1 " weeks and prn if questions or concerns.     Susie Heck, APRN CNM

## 2019-06-26 NOTE — PROGRESS NOTES
"Subjective:      34 year old  at 36w4d presents for a routine prenatal appointment.         No vaginal bleeding,  leakage of fluid, or change in vaginal discharge.  No contractions.  Good fetal movement.     No HA, visual changes, RUQ or epigastric pain.   Patient concerns: Feeling well overall.     Objective:  Vitals:    19 1133   BP: 114/79   BP Location: Left arm   Patient Position: Chair   Pulse: 76   Weight: 87.9 kg (193 lb 12.8 oz)   Height: 1.651 m (5' 5\")    See OB flowsheet    Assessment/Plan  34 year old  at 36w4d with a normal prenatal appointment.      Encounter Diagnoses   Name Primary?     Encounter for supervision of normal first pregnancy in third trimester - [facility] CNM Yes     History of anxiety      Vitamin D deficiency      Orders Placed This Encounter   Procedures     CBC with Platelets       PHQ-9 SCORE 2011   PHQ-9 Total Score 6 -   PHQ-9 Total Score - 0       - Discussed  Birth plan and questions were answered and reassured.   - GBS screening: Obtained.  - Birth preferences reviewed: Un-Medicated, see birth plan in [facility] Medical Advice on 2019  - Labor signs discussed. Reinforced daily fetal movement counts.  - Reviewed why/how to contact provider if headache/visual changes/RUQ or epigastric pain, decreased fetal movement, vaginal bleeding, leakage of fluid.  - Return to clinic in 1 week and prn if questions or concerns.     Ra Saini, MS3  I agree with the PFSH and ROS as completed by the student, except for changes made by me. The remainder of the encounter was performed by me and scribed by the student. The scribed note accurately reflects my personal services and decisions made by me.  Susie Heck, DNP, CNM, APRN    Susie Heck, APRN CNM  "

## 2019-06-27 ENCOUNTER — OFFICE VISIT (OUTPATIENT)
Dept: OBGYN | Facility: CLINIC | Age: 34
End: 2019-06-27
Attending: ADVANCED PRACTICE MIDWIFE
Payer: COMMERCIAL

## 2019-06-27 VITALS
SYSTOLIC BLOOD PRESSURE: 114 MMHG | WEIGHT: 193.8 LBS | HEIGHT: 65 IN | HEART RATE: 76 BPM | DIASTOLIC BLOOD PRESSURE: 79 MMHG | BODY MASS INDEX: 32.29 KG/M2

## 2019-06-27 DIAGNOSIS — E55.9 VITAMIN D DEFICIENCY: ICD-10-CM

## 2019-06-27 DIAGNOSIS — Z86.59 HISTORY OF ANXIETY: ICD-10-CM

## 2019-06-27 DIAGNOSIS — Z34.03 ENCOUNTER FOR SUPERVISION OF NORMAL FIRST PREGNANCY IN THIRD TRIMESTER: Primary | ICD-10-CM

## 2019-06-27 LAB
ERYTHROCYTE [DISTWIDTH] IN BLOOD BY AUTOMATED COUNT: 12.5 % (ref 10–15)
HCT VFR BLD AUTO: 37.3 % (ref 35–47)
HGB BLD-MCNC: 12.6 G/DL (ref 11.7–15.7)
MCH RBC QN AUTO: 31.1 PG (ref 26.5–33)
MCHC RBC AUTO-ENTMCNC: 33.8 G/DL (ref 31.5–36.5)
MCV RBC AUTO: 92 FL (ref 78–100)
PLATELET # BLD AUTO: 162 10E9/L (ref 150–450)
RBC # BLD AUTO: 4.05 10E12/L (ref 3.8–5.2)
WBC # BLD AUTO: 9.4 10E9/L (ref 4–11)

## 2019-06-27 PROCEDURE — 85027 COMPLETE CBC AUTOMATED: CPT | Performed by: ADVANCED PRACTICE MIDWIFE

## 2019-06-27 PROCEDURE — 87653 STREP B DNA AMP PROBE: CPT | Performed by: ADVANCED PRACTICE MIDWIFE

## 2019-06-27 PROCEDURE — G0463 HOSPITAL OUTPT CLINIC VISIT: HCPCS | Mod: ZF

## 2019-06-27 PROCEDURE — 36415 COLL VENOUS BLD VENIPUNCTURE: CPT | Performed by: ADVANCED PRACTICE MIDWIFE

## 2019-06-27 RX ORDER — AMOXICILLIN 250 MG
1 CAPSULE ORAL DAILY
Qty: 100 TABLET | Refills: 0 | Status: CANCELLED | OUTPATIENT
Start: 2019-06-27

## 2019-06-27 RX ORDER — ACETAMINOPHEN 325 MG/1
650 TABLET ORAL EVERY 6 HOURS PRN
Qty: 100 TABLET | Refills: 0 | Status: CANCELLED | OUTPATIENT
Start: 2019-06-27

## 2019-06-27 RX ORDER — IBUPROFEN 600 MG/1
600 TABLET, FILM COATED ORAL EVERY 6 HOURS PRN
Qty: 60 TABLET | Refills: 0 | Status: CANCELLED | OUTPATIENT
Start: 2019-06-27

## 2019-06-27 ASSESSMENT — ANXIETY QUESTIONNAIRES
6. BECOMING EASILY ANNOYED OR IRRITABLE: NOT AT ALL
2. NOT BEING ABLE TO STOP OR CONTROL WORRYING: NOT AT ALL
7. FEELING AFRAID AS IF SOMETHING AWFUL MIGHT HAPPEN: NOT AT ALL
5. BEING SO RESTLESS THAT IT IS HARD TO SIT STILL: NOT AT ALL
GAD7 TOTAL SCORE: 0
1. FEELING NERVOUS, ANXIOUS, OR ON EDGE: NOT AT ALL
3. WORRYING TOO MUCH ABOUT DIFFERENT THINGS: NOT AT ALL

## 2019-06-27 ASSESSMENT — PATIENT HEALTH QUESTIONNAIRE - PHQ9: 5. POOR APPETITE OR OVEREATING: NOT AT ALL

## 2019-06-27 ASSESSMENT — MIFFLIN-ST. JEOR: SCORE: 1579.95

## 2019-06-27 NOTE — LETTER
"2019       RE: Tiana Soto  [address] [facility] [address]     Dear Colleague,    Thank you for referring your patient, Tiana Soto, to the [facility] at [facility]. Please see a copy of my visit note below.    Subjective:      34 year old  at 36w4d presents for a routine prenatal appointment.         No vaginal bleeding,  leakage of fluid, or change in vaginal discharge.  No contractions.  Good fetal movement.     No HA, visual changes, RUQ or epigastric pain.   Patient concerns: Feeling well overall.     Objective:  Vitals:    19 1133   BP: 114/79   BP Location: Left arm   Patient Position: Chair   Pulse: 76   Weight: 87.9 kg (193 lb 12.8 oz)   Height: 1.651 m (5' 5\")    See OB flowsheet    Assessment/Plan  34 year old  at 36w4d with a normal prenatal appointment.      Encounter Diagnoses   Name Primary?     Encounter for supervision of normal first pregnancy in third trimester - [facility] CNM Yes     History of anxiety      Vitamin D deficiency      Orders Placed This Encounter   Procedures     CBC with Platelets       PHQ-9 SCORE 2011   PHQ-9 Total Score 6 -   PHQ-9 Total Score - 0       - Discussed  Birth plan and questions were answered and reassured.   - GBS screening: Obtained.  - Birth preferences reviewed: Un-Medicated, see birth plan in [facility] Medical Advice on 2019  - Labor signs discussed. Reinforced daily fetal movement counts.  - Reviewed why/how to contact provider if headache/visual changes/RUQ or epigastric pain, decreased fetal movement, vaginal bleeding, leakage of fluid.  - Return to clinic in 1 week and prn if questions or concerns.     Ra Saini, MS3  I agree with the PFSH and ROS as completed by the student, except for changes made by me. The remainder of the encounter was performed by me and scribed by the student. The scribed note accurately reflects my personal services and decisions made " by me.  Susie Heck, DESTINY, CNM, APRN    Susie Heck, APRN CNM

## 2019-06-28 LAB
GP B STREP DNA SPEC QL NAA+PROBE: NEGATIVE
SPECIMEN SOURCE: NORMAL

## 2019-06-28 ASSESSMENT — ANXIETY QUESTIONNAIRES: GAD7 TOTAL SCORE: 0

## 2019-07-05 ENCOUNTER — OFFICE VISIT (OUTPATIENT)
Dept: OBGYN | Facility: CLINIC | Age: 34
End: 2019-07-05
Attending: ADVANCED PRACTICE MIDWIFE
Payer: COMMERCIAL

## 2019-07-05 VITALS
HEIGHT: 65 IN | HEART RATE: 75 BPM | BODY MASS INDEX: 32.42 KG/M2 | SYSTOLIC BLOOD PRESSURE: 124 MMHG | WEIGHT: 194.6 LBS | DIASTOLIC BLOOD PRESSURE: 81 MMHG

## 2019-07-05 DIAGNOSIS — Z34.03 ENCOUNTER FOR SUPERVISION OF NORMAL FIRST PREGNANCY IN THIRD TRIMESTER: Primary | ICD-10-CM

## 2019-07-05 PROCEDURE — G0463 HOSPITAL OUTPT CLINIC VISIT: HCPCS | Mod: ZF

## 2019-07-05 ASSESSMENT — PAIN SCALES - GENERAL: PAINLEVEL: NO PAIN (0)

## 2019-07-05 ASSESSMENT — MIFFLIN-ST. JEOR: SCORE: 1583.58

## 2019-07-05 NOTE — PROGRESS NOTES
"Subjective:     34 year old  at 37w6d presents for a routine prenatal appointment. Denies vaginal bleeding, leakage of fluid, or change in vaginal discharge. No contractions. Positive fetal movement. No HA, visual changes, RUQ or epigastric pain.  Patient concerns: None. Feeling well overall.     Objective:  Vitals:    19 1345   BP: 124/81   Pulse: 75   Weight: 88.3 kg (194 lb 9.6 oz)   Height: 1.651 m (5' 5\")   See OB flowsheet    Assessment/Plan     Encounter Diagnosis   Name Primary?     Encounter for supervision of normal first pregnancy in third trimester - [facility] CNM Yes           PHQ-9 SCORE 2011   PHQ-9 Total Score 6 -   PHQ-9 Total Score - 0     GBS screening: Obtained at visit on - was Negative.   Birth preferences reviewed: Un-Medicated  Labor signs discussed. Reinforced daily fetal movement counts.  Reviewed why/how to contact provider if headache/visual changes/RUQ or epigastric pain, decreased fetal movement, vaginal bleeding, leakage of fluid.  Return to clinic in 1 week and prn if questions or concerns.     I, Ghazal Dillon, am serving as a scribe; to document services personally performed by DAT Burnett CNM based on data collection and the provider's statements to me.     Ghazal Dillon RN, DNP Student  The encounter was performed by me and scribed by the DNP student. The scribed note accurately reflects my personal services and decisions made by me. DAT Davis CNM    "

## 2019-07-05 NOTE — LETTER
"2019       RE: Tiana Soto  [address] [facility] [address]     Dear Colleague,    Thank you for referring your patient, Tiana Soto, to the [facility] at [facility]. Please see a copy of my visit note below.    Subjective:     34 year old  at 37w6d presents for a routine prenatal appointment. Denies vaginal bleeding, leakage of fluid, or change in vaginal discharge. No contractions. Positive fetal movement. No HA, visual changes, RUQ or epigastric pain.  Patient concerns: None. Feeling well overall.     Objective:  Vitals:    19 1345   BP: 124/81   Pulse: 75   Weight: 88.3 kg (194 lb 9.6 oz)   Height: 1.651 m (5' 5\")   See OB flowsheet    Assessment/Plan     Encounter Diagnosis   Name Primary?     Encounter for supervision of normal first pregnancy in third trimester - S CNM Yes           PHQ-9 SCORE 2011   PHQ-9 Total Score 6 -   PHQ-9 Total Score - 0     GBS screening: Obtained at visit on - was Negative.   Birth preferences reviewed: Un-Medicated  Labor signs discussed. Reinforced daily fetal movement counts.  Reviewed why/how to contact provider if headache/visual changes/RUQ or epigastric pain, decreased fetal movement, vaginal bleeding, leakage of fluid.  Return to clinic in 1 week and prn if questions or concerns.     I, Ghazal Dillon, am serving as a scribe; to document services personally performed by DAT Burnett CNM based on data collection and the provider's statements to me.     Ghazal Dillon RN, DNP Student  The encounter was performed by me and scribed by the DNP student. The scribed note accurately reflects my personal services and decisions made by me. DAT Davis CNM    "

## 2019-07-10 ENCOUNTER — NURSE TRIAGE (OUTPATIENT)
Dept: FAMILY MEDICINE | Facility: CLINIC | Age: 34
End: 2019-07-10

## 2019-07-10 NOTE — TELEPHONE ENCOUNTER
Reason for Call:  Other call back    Detailed comments: Pt called and would like to speak with a provider or an nurse. She is in the end staged of her pregnancy and she has been noticing some dark blotches on her skin more like skin growth and she would like to know if she should come in or what she should do. Please Advise    Phone Number Patient can be reached at: Home number on file [phone] (home)    Best Time: anytime    Can we leave a detailed message on this number? YES    Call taken on 7/10/2019 at 10:21 AM by Aurelia Hood

## 2019-07-10 NOTE — TELEPHONE ENCOUNTER
Agree with RN that melasma is common during pregnancy.  During her ob visit, she can talk to OB who are familiar with pregnancy related skin changes.  If OB disagrees with diagnosis or if they show any concern - she can see dermatologist.

## 2019-07-10 NOTE — TELEPHONE ENCOUNTER
"Covering provider-Please review and advise:  1. Patient has developed small, slightly darker colored growths on skin during second and third trimesters of pregnancy  2. Over the last three weeks developed a growth on right clavicle similar to a skin tag that is a couple shades darker color than patient's skin and has a pinprick sized dark brown spot in middle  3. No pain, redness, edema, drainage/bleeding from this growth  4. Okay for patient to continue to monitor, or recommend office visit for follow up?    Writer is aware chloasma can occur during pregnancy.    Thank you!  JERAD FallN, RN    Additional Information    Negative: Patient sounds very sick or weak to the triager    Negative: Fever and bump is tender to touch    Negative: Looks infected (e.g., spreading redness, pus, red streak)    Negative: Looks like a boil, infected sore, or deep ulcer    Negative: Caller can't describe it clearly    Negative: Patient wants to be seen    Negative: Skin growth or mole and two sides do not look the same (it is asymmetric)    Negative: Skin growth or mole and border is irregular or blurry    Negative: Skin growth or mole and changes color or it has more than one color    Negative: Skin growth or mole and it is larger than a pencil eraser or increasing in size    Negative: Skin growth or mole and bleeds    Sticks up out of the skin (elevated), and feels rough to the touch    Answer Assessment - Initial Assessment Questions  1. APPEARANCE of LESION: \"What does it look like?\"       Small raised skin-tag like area on right collar bone  2. SIZE: \"How big is it?\" (e.g., compare to size of pinhead, tip of pen, eraser, coin, pea, grape, ping pong ball; or size in cms or inches)       Risen above skin, it is small  3. COLOR: \"What color is it?\" \"Is there more than one color?\"      The growth is a couple shades darker than her usual skin tone and there is a small, dark brown spot in center that is size of pinprick  4. " "SHAPE: \"What shape is it?\" (e.g., round, irregular)      Round  5. RAISED: \"Does it stick up above the skin or is it flat?\" (e.g., raised or elevated)      Raised  6. TENDER: \"Does it hurt when you touch it?\"  (Scale 1-10; or mild, moderate, severe)      No  7. LOCATION: \"Where is it located?\"       Right collar bone  8. ONSET: \"When did it first appear?\"       Developed of the last 3 weeks  9. NUMBER: \"Is there just one?\" or \"Are there others?\"      This is the largest, but developed small growths in other areas of her body that are 1-2 tones darker than her skin color.  These developed during second and third trimesters  10. CAUSE: \"What do you think it is?\"        Unsure  11. OTHER SYMPTOMS: \"Do you have any other symptoms?\" (e.g., fever)        No  12. PREGNANCY: \"Is there any chance you are pregnant?\" \"When was your last menstrual period?\"        Yes, is in third trimester of pregnancy (38w4d)    Protocols used: SKIN LESION - MOLES OR GROWTHS-A-OH    "

## 2019-07-10 NOTE — TELEPHONE ENCOUNTER
Writer called patient and reviewed message per Dr. Newberry.    Patient verbalized understanding and in agreement with plan.    Writer informed patient PCP clinic can provide dermatology referral, if needed.    JERAD FallN, RN

## 2019-07-11 ENCOUNTER — OFFICE VISIT (OUTPATIENT)
Dept: OBGYN | Facility: CLINIC | Age: 34
End: 2019-07-11
Attending: MIDWIFE
Payer: COMMERCIAL

## 2019-07-11 VITALS
DIASTOLIC BLOOD PRESSURE: 77 MMHG | HEART RATE: 90 BPM | BODY MASS INDEX: 32.97 KG/M2 | WEIGHT: 197.9 LBS | SYSTOLIC BLOOD PRESSURE: 111 MMHG | HEIGHT: 65 IN

## 2019-07-11 DIAGNOSIS — D22.9 CHANGE IN COLOR OF SKIN MOLE: ICD-10-CM

## 2019-07-11 DIAGNOSIS — Z34.03 ENCOUNTER FOR SUPERVISION OF NORMAL FIRST PREGNANCY IN THIRD TRIMESTER: Primary | ICD-10-CM

## 2019-07-11 PROCEDURE — G0463 HOSPITAL OUTPT CLINIC VISIT: HCPCS | Mod: ZF

## 2019-07-11 ASSESSMENT — MIFFLIN-ST. JEOR: SCORE: 1598.55

## 2019-07-11 ASSESSMENT — PAIN SCALES - GENERAL: PAINLEVEL: NO PAIN (0)

## 2019-07-11 NOTE — PROGRESS NOTES
"Subjective:     34 year old  at 38w5d presents for routine prenatal visit.          no vaginal bleeding or leakage of fluid.  occ  contractions.  Good  fetal movement.        No HA, visual changes, RUQ or epigastric pain.   Patient concerns: would like derm referral noted change in 1 small mole right collarbone   Feeling well overall.  Objective:  Vitals:    19 1013   BP: 111/77   Pulse: 90   Weight: 89.8 kg (197 lb 14.4 oz)   Height: 1.651 m (5' 5\")    See OB flowsheet  Assessment/Plan     Encounter Diagnoses   Name Primary?     Encounter for supervision of normal first pregnancy in third trimester - [facility] CNM Yes     Change in color of skin mole      Orders Placed This Encounter   Procedures     DERMATOLOGY REFERRAL     No orders of the defined types were placed in this encounter.    - Reviewed postdates testing including BPP => 41 weeks and rationale for induction of labor based on results.   Patient  undecided re:  induction or postdates testing.  - Reviewed why/how to contact provider if headache/visual changes/RUQ or epigastric pain, decreased fetal movement, vaginal bleeding, leakage of fluid or strong/regular contractions.   Patient education/orders or handouts today:  Sign/symptoms of labor and When to call for labor or other concerns  Return to clinic in 1 week and prn if questions or concerns.   DAT Madrid CNM      "

## 2019-07-11 NOTE — LETTER
"2019       RE: Tiana Soto  [address] [address]     Dear Colleague,    Thank you for referring your patient, Tiana Soto, to the [facility] at [facility]. Please see a copy of my visit note below.    Subjective:     34 year old  at 38w5d presents for routine prenatal visit.          no vaginal bleeding or leakage of fluid.  occ  contractions.  Good  fetal movement.        No HA, visual changes, RUQ or epigastric pain.   Patient concerns: would like derm referral noted change in 1 small mole right collarbone   Feeling well overall.  Objective:  Vitals:    19 1013   BP: 111/77   Pulse: 90   Weight: 89.8 kg (197 lb 14.4 oz)   Height: 1.651 m (5' 5\")    See OB flowsheet  Assessment/Plan     Encounter Diagnoses   Name Primary?     Encounter for supervision of normal first pregnancy in third trimester - S CNM Yes     Change in color of skin mole      Orders Placed This Encounter   Procedures     DERMATOLOGY REFERRAL     No orders of the defined types were placed in this encounter.    - Reviewed postdates testing including BPP => 41 weeks and rationale for induction of labor based on results.   Patient  undecided re:  induction or postdates testing.  - Reviewed why/how to contact provider if headache/visual changes/RUQ or epigastric pain, decreased fetal movement, vaginal bleeding, leakage of fluid or strong/regular contractions.   Patient education/orders or handouts today:  Sign/symptoms of labor and When to call for labor or other concerns  Return to clinic in 1 week and prn if questions or concerns.   DAT MadridM    "

## 2019-07-18 ENCOUNTER — OFFICE VISIT (OUTPATIENT)
Dept: OBGYN | Facility: CLINIC | Age: 34
End: 2019-07-18
Attending: MIDWIFE
Payer: COMMERCIAL

## 2019-07-18 ENCOUNTER — TELEPHONE (OUTPATIENT)
Dept: OBGYN | Facility: CLINIC | Age: 34
End: 2019-07-18

## 2019-07-18 VITALS
WEIGHT: 194.4 LBS | DIASTOLIC BLOOD PRESSURE: 77 MMHG | SYSTOLIC BLOOD PRESSURE: 114 MMHG | HEIGHT: 65 IN | BODY MASS INDEX: 32.39 KG/M2 | HEART RATE: 94 BPM

## 2019-07-18 DIAGNOSIS — Z34.03 ENCOUNTER FOR SUPERVISION OF NORMAL FIRST PREGNANCY IN THIRD TRIMESTER: Primary | ICD-10-CM

## 2019-07-18 PROCEDURE — G0463 HOSPITAL OUTPT CLINIC VISIT: HCPCS | Mod: ZF

## 2019-07-18 ASSESSMENT — MIFFLIN-ST. JEOR: SCORE: 1582.67

## 2019-07-18 NOTE — LETTER
"2019       RE: Tiana Soto  [address] [address]     Dear Colleague,    Thank you for referring your patient, Tiana Soto, to the [facility] at [facility]. Please see a copy of my visit note below.    Subjective:     34 year old  at 39w5d presents for routine prenatal visit.          no vaginal bleeding or leakage of fluid.  occ  contractions.  Good  fetal movement.        No HA, visual changes, RUQ or epigastric pain.   Patient concerns:   Feeling well overall. Hopes to avoid IOL  Aware of recommendation for BPP at 41 wks   Objective:  Vitals:    19 0942   BP: 114/77   BP Location: Left arm   Patient Position: Chair   Pulse: 94   Weight: 88.2 kg (194 lb 6.4 oz)   Height: 1.651 m (5' 5\")    See OB flowsheet  Assessment/Plan     Encounter Diagnosis   Name Primary?     Encounter for supervision of normal first pregnancy in third trimester - [facility] CNM Yes     Orders Placed This Encounter   Procedures     US OB Fetal Biophys Prf wo NonStrs Singls Sgl     No orders of the defined types were placed in this encounter.    - Reviewed postdates testing including BPP => 41 weeks and rationale for induction of labor based on results.   Patient desires postdates testing.  - Reviewed why/how to contact provider if headache/visual changes/RUQ or epigastric pain, decreased fetal movement, vaginal bleeding, leakage of fluid or strong/regular contractions.   Patient education/orders or handouts today:  Sign/symptoms of labor, When to call for labor or other concerns and BPP  Return to clinic in 1 week and prn if questions or concerns.   DAT Madrid CNM  "

## 2019-07-18 NOTE — TELEPHONE ENCOUNTER
Pt noted to be 41w0d on Saturday 7/27/19. NST (with bedside MVP by MD team prn) scheduled on L&D at 1000.   BPP at S on Monday 7/29/19 at 1130 if undelivered.   Spoke with pt and reveiwed plan.  [facility] L&D ~an hour before NST on Saturday to be sure timing still appropriate.  Reinforced worrisome s/s and how/why to contact CNM prn if decreased fetal movement, VB, LOF, labor, or s/s of Pre E.  RTO as scheduled for VIDAL before NST.    All pt's questions discussed and answered.  Pt verbalized understanding of and agreement to plan of care.        Bettie DAUGHERTY, KEERTHI

## 2019-07-18 NOTE — PROGRESS NOTES
"Subjective:     34 year old  at 39w5d presents for routine prenatal visit.          no vaginal bleeding or leakage of fluid.  occ  contractions.  Good  fetal movement.        No HA, visual changes, RUQ or epigastric pain.   Patient concerns:   Feeling well overall. Hopes to avoid IOL  Aware of recommendation for BPP at 41 wks   Objective:  Vitals:    19 0942   BP: 114/77   BP Location: Left arm   Patient Position: Chair   Pulse: 94   Weight: 88.2 kg (194 lb 6.4 oz)   Height: 1.651 m (5' 5\")    See OB flowsheet  Assessment/Plan     Encounter Diagnosis   Name Primary?     Encounter for supervision of normal first pregnancy in third trimester - [facility] CNM Yes     Orders Placed This Encounter   Procedures     US OB Fetal Biophys Prf wo NonStrs Singls Sgl     No orders of the defined types were placed in this encounter.    - Reviewed postdates testing including BPP => 41 weeks and rationale for induction of labor based on results.   Patient desires postdates testing.  - Reviewed why/how to contact provider if headache/visual changes/RUQ or epigastric pain, decreased fetal movement, vaginal bleeding, leakage of fluid or strong/regular contractions.   Patient education/orders or handouts today:  Sign/symptoms of labor, When to call for labor or other concerns and BPP  Return to clinic in 1 week and prn if questions or concerns.   DAT Madrid CNM      "

## 2019-07-27 ENCOUNTER — HOSPITAL ENCOUNTER (OUTPATIENT)
Facility: CLINIC | Age: 34
Discharge: HOME | End: 2019-07-27
Attending: ADVANCED PRACTICE MIDWIFE | Admitting: ADVANCED PRACTICE MIDWIFE
Payer: COMMERCIAL

## 2019-07-27 VITALS — SYSTOLIC BLOOD PRESSURE: 122 MMHG | RESPIRATION RATE: 17 BRPM | DIASTOLIC BLOOD PRESSURE: 82 MMHG | TEMPERATURE: 97.9 F

## 2019-07-27 PROCEDURE — G0463 HOSPITAL OUTPT CLINIC VISIT: HCPCS | Mod: 25

## 2019-07-27 PROCEDURE — 59025 FETAL NON-STRESS TEST: CPT

## 2019-07-27 NOTE — DISCHARGE INSTRUCTIONS
Discharge Instruction for Undelivered Patients      You were seen for: Fetal Assessment   We Consulted: Mandy Castellanos CNM  You had (Test or Medicine): Non Stress Test     Diet:   Drink 8 to 12 glasses of liquids (milk, juice, water) every day.  You may eat meals and snacks.     Activity:  Count fetal kicks everyday (see handout)  Call your doctor or nurse midwife if your baby is moving less than usual.     Call your provider if you notice:  Swelling in your face or increased swelling in your hands or legs.  Headaches that are not relieved by Tylenol (acetaminophen).  Changes in your vision (blurring: seeing spots or stars.)  Nausea (sick to your stomach) and vomiting (throwing up).   Weight gain of 5 pounds or more per week.  Heartburn that doesn't go away.  Signs of bladder infection: pain when you urinate (use the toilet), need to go more often and more urgently.  The bag of shelby (rupture of membranes) breaks, or you notice leaking in your underwear.  Bright red blood in your underwear.  Abdominal (lower belly) or stomach pain.  For first baby: Contractions (tightening) less than 5 minutes apart for one hour or more.  Increase or change in vaginal discharge (note the color and amount)      Follow-up:  As scheduled in the clinic and Induction of Labor scheduled for Thursday, August 1 at 10:00am.

## 2019-07-27 NOTE — PROGRESS NOTES
Data: [facility].   Reason for maternal/fetal assessment per patient is for Fetal Non Stress Test. Patient is a . Prenatal record reviewed.      OB History    Para Term  AB Living   1 0 0 0 0 0   SAB TAB Ectopic Multiple Live Births   0 0 0 0 0      # Outcome Date GA Lbr Javier/2nd Weight Sex Delivery Anes PTL Lv   1 Current               Medical History:   Past Medical History:   Diagnosis Date     Depressive disorder     Treated for depression in adolescence, not since then.     Tobacco use disorder     Stopped prior to pregnancy   . Gestational Age 41w0d. VSS.  Fetal movement present. Patient denies cramping, backache, vaginal discharge, pelvic pressure, UTI symptoms, GI problems, bloody show, vaginal bleeding, edema, headache, visual disturbances, epigastric or URQ pain, abdominal pain, rupture of membranes. Support person Harris present.  Action: Verbal consent for EFM. Triage assessment completed. EFM applied for NST: Reactive. Uterine assessment shows x1 uc, which patient denies feeling. Fetal assessment: Presumed adequate fetal oxygenation documented (see flow record).Response:   Mandy Castellanos CNM notified of patient's arrival, and CNM reviewing NST: Reactive, SVE: FT/high. Plan per provider is for patient to discharge to home, and IOL scheduled for .  Patient education pamphlets given on fetal movement counts and follow up appointments. Patient instructed to report change in fetal movement, vaginal leaking of fluid or bleeding, abdominal pain, or any concerns related to the pregnancy to her nurse/physician.   Patient verbalized understanding of education and verbalized agreement with plan. Discharged ambulatory at 1150.

## 2019-07-27 NOTE — PROGRESS NOTES
"S: Savana is a  at 41 weeks today who presents for a scheduled NST. She is feeling well, denies LOF, denies vaginal bleeding, endorses fetal movement. States \"it hasn't been hard to carry this baby, I am not anxious to deliver\". They have discussed with other midwives in this group the recommendation to induce by 19 and that is the date they are anticipating for an IOL if she is not in spontaneous labor by then. They have multiple questions about IOL and are wanting to avoid induction and c/section if at all possible. They have a friend/ who will be with them for the birth.     O: /82   Temp 97.9  F (36.6  C) (Oral)   Resp 17   LMP 10/13/2018 (Exact Date)     NST: Reactive, baseline 120, moderate variability with several 15x15 accelerations. No contractions noted on strip.     Cervix: very posterior, 1cm/50%/-3 moderate. Internal os difficult to reach.     A/P: Gravid female at 41 weeks.     -Discussed option for membrane sweep with pt and partner   -IOL scheduled for 19 at 1000.  -[facility] RN pool messaged to schedule a BPP for early next week.  -Keep appt on Monday.     Namrata Castellanos CNM    "

## 2019-07-29 ENCOUNTER — TELEPHONE (OUTPATIENT)
Dept: OBGYN | Facility: CLINIC | Age: 34
End: 2019-07-29

## 2019-07-29 ENCOUNTER — ANCILLARY PROCEDURE (OUTPATIENT)
Dept: ULTRASOUND IMAGING | Facility: CLINIC | Age: 34
End: 2019-07-29
Attending: MIDWIFE
Payer: COMMERCIAL

## 2019-07-29 DIAGNOSIS — Z34.03 ENCOUNTER FOR SUPERVISION OF NORMAL FIRST PREGNANCY IN THIRD TRIMESTER: ICD-10-CM

## 2019-07-29 PROCEDURE — 76819 FETAL BIOPHYS PROFIL W/O NST: CPT

## 2019-07-29 NOTE — TELEPHONE ENCOUNTER
Left voicemail message for patient about borderline SEBASTIAN of 7.4 today.  Encouraged patient to call to speak with on-call CNM.  Patient has clinic appointment on 7/30/19  DAT Davis CNM

## 2019-07-29 NOTE — TELEPHONE ENCOUNTER
Received call from Dr. Jean stating pt had ultrasound today and SEBASTIAN was 7.4, which is borderline oligo. She asked for nurse to connect with midwife to call her to discuss induction.    Spoke with on-call midwife, Thu Mazariegos, and gave her above information. She will call patient.

## 2019-07-30 ENCOUNTER — HOSPITAL ENCOUNTER (OUTPATIENT)
Facility: CLINIC | Age: 34
Discharge: HOME | End: 2019-07-30
Attending: ADVANCED PRACTICE MIDWIFE | Admitting: ADVANCED PRACTICE MIDWIFE
Payer: COMMERCIAL

## 2019-07-30 ENCOUNTER — HOSPITAL ENCOUNTER (INPATIENT)
Facility: CLINIC | Age: 34
LOS: 3 days | Discharge: HOME | End: 2019-08-02
Attending: ADVANCED PRACTICE MIDWIFE | Admitting: ADVANCED PRACTICE MIDWIFE
Payer: COMMERCIAL

## 2019-07-30 VITALS — TEMPERATURE: 97.8 F | DIASTOLIC BLOOD PRESSURE: 73 MMHG | RESPIRATION RATE: 18 BRPM | SYSTOLIC BLOOD PRESSURE: 114 MMHG

## 2019-07-30 PROBLEM — O36.8190 DECREASED FETAL MOVEMENT: Status: ACTIVE | Noted: 2019-07-30

## 2019-07-30 PROBLEM — Z36.89 ENCOUNTER FOR TRIAGE IN PREGNANT PATIENT: Status: ACTIVE | Noted: 2019-07-30

## 2019-07-30 LAB
ABO + RH BLD: NORMAL
ABO + RH BLD: NORMAL
BASOPHILS # BLD AUTO: 0.1 10E9/L (ref 0–0.2)
BASOPHILS NFR BLD AUTO: 0.9 %
BLD GP AB SCN SERPL QL: NORMAL
BLOOD BANK CMNT PATIENT-IMP: NORMAL
DIFFERENTIAL METHOD BLD: ABNORMAL
EOSINOPHIL # BLD AUTO: 0 10E9/L (ref 0–0.7)
EOSINOPHIL NFR BLD AUTO: 0 %
ERYTHROCYTE [DISTWIDTH] IN BLOOD BY AUTOMATED COUNT: 12.5 % (ref 10–15)
HCT VFR BLD AUTO: 40.2 % (ref 35–47)
HGB BLD-MCNC: 13.5 G/DL (ref 11.7–15.7)
LYMPHOCYTES # BLD AUTO: 1.9 10E9/L (ref 0.8–5.3)
LYMPHOCYTES NFR BLD AUTO: 12.4 %
MCH RBC QN AUTO: 31 PG (ref 26.5–33)
MCHC RBC AUTO-ENTMCNC: 33.6 G/DL (ref 31.5–36.5)
MCV RBC AUTO: 92 FL (ref 78–100)
MONOCYTES # BLD AUTO: 1.1 10E9/L (ref 0–1.3)
MONOCYTES NFR BLD AUTO: 7.1 %
NEUTROPHILS # BLD AUTO: 11.9 10E9/L (ref 1.6–8.3)
NEUTROPHILS NFR BLD AUTO: 79.6 %
PLATELET # BLD AUTO: 173 10E9/L (ref 150–450)
PLATELET # BLD EST: ABNORMAL 10*3/UL
RBC # BLD AUTO: 4.36 10E12/L (ref 3.8–5.2)
RBC MORPH BLD: NORMAL
RUPTURE OF FETAL MEMBRANES BY ROM PLUS: NEGATIVE
SPECIMEN EXP DATE BLD: NORMAL
WBC # BLD AUTO: 15 10E9/L (ref 4–11)

## 2019-07-30 PROCEDURE — 86900 BLOOD TYPING SEROLOGIC ABO: CPT | Performed by: ADVANCED PRACTICE MIDWIFE

## 2019-07-30 PROCEDURE — 86901 BLOOD TYPING SEROLOGIC RH(D): CPT | Performed by: ADVANCED PRACTICE MIDWIFE

## 2019-07-30 PROCEDURE — 12000001 ZZH R&B MED SURG/OB UMMC

## 2019-07-30 PROCEDURE — 59025 FETAL NON-STRESS TEST: CPT

## 2019-07-30 PROCEDURE — 85025 COMPLETE CBC W/AUTO DIFF WBC: CPT | Performed by: ADVANCED PRACTICE MIDWIFE

## 2019-07-30 PROCEDURE — 86850 RBC ANTIBODY SCREEN: CPT | Performed by: ADVANCED PRACTICE MIDWIFE

## 2019-07-30 PROCEDURE — 84112 EVAL AMNIOTIC FLUID PROTEIN: CPT | Performed by: ADVANCED PRACTICE MIDWIFE

## 2019-07-30 PROCEDURE — 86780 TREPONEMA PALLIDUM: CPT | Performed by: ADVANCED PRACTICE MIDWIFE

## 2019-07-30 PROCEDURE — 25800030 ZZH RX IP 258 OP 636: Performed by: ADVANCED PRACTICE MIDWIFE

## 2019-07-30 PROCEDURE — 36415 COLL VENOUS BLD VENIPUNCTURE: CPT | Performed by: ADVANCED PRACTICE MIDWIFE

## 2019-07-30 PROCEDURE — G0463 HOSPITAL OUTPT CLINIC VISIT: HCPCS | Mod: 25

## 2019-07-30 RX ORDER — FENTANYL CITRATE 50 UG/ML
50-100 INJECTION, SOLUTION INTRAMUSCULAR; INTRAVENOUS
Status: DISCONTINUED | OUTPATIENT
Start: 2019-07-30 | End: 2019-07-31

## 2019-07-30 RX ORDER — MISOPROSTOL 200 UG/1
TABLET ORAL
Status: DISCONTINUED
Start: 2019-07-30 | End: 2019-07-31 | Stop reason: HOSPADM

## 2019-07-30 RX ORDER — ONDANSETRON 2 MG/ML
4 INJECTION INTRAMUSCULAR; INTRAVENOUS EVERY 6 HOURS PRN
Status: DISCONTINUED | OUTPATIENT
Start: 2019-07-30 | End: 2019-07-31

## 2019-07-30 RX ORDER — METHYLERGONOVINE MALEATE 0.2 MG/ML
200 INJECTION INTRAVENOUS
Status: DISCONTINUED | OUTPATIENT
Start: 2019-07-30 | End: 2019-07-31

## 2019-07-30 RX ORDER — ONDANSETRON 2 MG/ML
4 INJECTION INTRAMUSCULAR; INTRAVENOUS EVERY 6 HOURS PRN
Status: DISCONTINUED | OUTPATIENT
Start: 2019-07-30 | End: 2019-07-30 | Stop reason: HOSPADM

## 2019-07-30 RX ORDER — NALOXONE HYDROCHLORIDE 0.4 MG/ML
.1-.4 INJECTION, SOLUTION INTRAMUSCULAR; INTRAVENOUS; SUBCUTANEOUS
Status: DISCONTINUED | OUTPATIENT
Start: 2019-07-30 | End: 2019-07-31

## 2019-07-30 RX ORDER — ACETAMINOPHEN 325 MG/1
650 TABLET ORAL EVERY 4 HOURS PRN
Status: DISCONTINUED | OUTPATIENT
Start: 2019-07-30 | End: 2019-07-31

## 2019-07-30 RX ORDER — OXYCODONE AND ACETAMINOPHEN 5; 325 MG/1; MG/1
1 TABLET ORAL
Status: DISCONTINUED | OUTPATIENT
Start: 2019-07-30 | End: 2019-07-31

## 2019-07-30 RX ORDER — OXYTOCIN 10 [USP'U]/ML
INJECTION, SOLUTION INTRAMUSCULAR; INTRAVENOUS
Status: DISCONTINUED
Start: 2019-07-30 | End: 2019-07-31 | Stop reason: HOSPADM

## 2019-07-30 RX ORDER — OXYTOCIN 10 [USP'U]/ML
10 INJECTION, SOLUTION INTRAMUSCULAR; INTRAVENOUS
Status: DISCONTINUED | OUTPATIENT
Start: 2019-07-30 | End: 2019-07-31

## 2019-07-30 RX ORDER — LIDOCAINE HYDROCHLORIDE 10 MG/ML
INJECTION, SOLUTION EPIDURAL; INFILTRATION; INTRACAUDAL; PERINEURAL
Status: DISCONTINUED
Start: 2019-07-30 | End: 2019-07-31 | Stop reason: HOSPADM

## 2019-07-30 RX ORDER — OXYTOCIN/0.9 % SODIUM CHLORIDE 30/500 ML
100-340 PLASTIC BAG, INJECTION (ML) INTRAVENOUS CONTINUOUS PRN
Status: DISCONTINUED | OUTPATIENT
Start: 2019-07-30 | End: 2019-07-31

## 2019-07-30 RX ORDER — OXYTOCIN/0.9 % SODIUM CHLORIDE 30/500 ML
PLASTIC BAG, INJECTION (ML) INTRAVENOUS
Status: DISCONTINUED
Start: 2019-07-30 | End: 2019-07-31 | Stop reason: HOSPADM

## 2019-07-30 RX ORDER — CARBOPROST TROMETHAMINE 250 UG/ML
250 INJECTION, SOLUTION INTRAMUSCULAR
Status: DISCONTINUED | OUTPATIENT
Start: 2019-07-30 | End: 2019-07-31

## 2019-07-30 RX ORDER — IBUPROFEN 800 MG/1
800 TABLET, FILM COATED ORAL
Status: DISCONTINUED | OUTPATIENT
Start: 2019-07-30 | End: 2019-07-31

## 2019-07-30 RX ADMIN — SODIUM CHLORIDE, POTASSIUM CHLORIDE, SODIUM LACTATE AND CALCIUM CHLORIDE 500 ML: 600; 310; 30; 20 INJECTION, SOLUTION INTRAVENOUS at 22:21

## 2019-07-30 NOTE — DISCHARGE INSTRUCTIONS
Discharge Instruction for Undelivered Patients      You were seen for: Labor Assessment, Membrane Assessment and Fetal Assessment  We Consulted: KEERTHI Lewis   You had (Test or Medicine):fetal and uterine monitoring, ROM plus (rupture of membranes test)     Diet:   Drink 8 to 12 glasses of liquids (milk, juice, water) every day.  You may eat meals and snacks.     Activity:  Count fetal kicks everyday (see handout)  Call your doctor or nurse midwife if your baby is moving less than usual.     Call your provider if you notice:  Swelling in your face or increased swelling in your hands or legs.  Headaches that are not relieved by Tylenol (acetaminophen).  Changes in your vision (blurring: seeing spots or stars.)  Nausea (sick to your stomach) and vomiting (throwing up).   Weight gain of 5 pounds or more per week.  Heartburn that doesn't go away.  Signs of bladder infection: pain when you urinate (use the toilet), need to go more often and more urgently.  The bag of shelby (rupture of membranes) breaks, or you notice leaking in your underwear.  Bright red blood in your underwear.  Abdominal (lower belly) or stomach pain.  For first baby: Contractions (tightening) less than 5 minutes apart for one hour or more.  Second (plus) baby: Contractions (tightening) less than 10 minutes apart and getting stronger.  *If less than 34 weeks: Contractions (tightenings) more than 6 times in one hour.  Increase or change in vaginal discharge (note the color and amount)    Follow-up:  As scheduled in the clinic

## 2019-07-30 NOTE — PROGRESS NOTES
HOSPITAL TRIAGE NOTE  ===================    CHIEF COMPLAINT  ========================  Tiana Soto is a 34 year old patient presenting today at 41w3d for evaluation of decreased fetal movement, leaking vaginal fluid.    Patient's last menstrual period was 10/13/2018 (exact date).  Estimated Date of Delivery: 2019       HPI  ==================   Pt had possible PROM last night around 1900 for clear fluid.  Was sitting on toilet and felt like she kept leaking after.  Originally felt normal fetal movement and no contractions, is GBS negative and was hoping to wait for labor so stayed home with expectant management at that time.  Later into the night she had concerns around possible decreased fetal movement and worrying that she should be seen.  Had BPP  yesterday.   Prenatal record and labs reviewed from [facility], through [facility].    CONTRACTIONS: irreg, mild and not felt by patient  ABDOMINAL PAIN: none  FETAL MOVEMENT: decreased at home but now resumed normal movement since admission to triage    VAGINAL BLEEDING: none  RUPTURE OF MEMBRANES: uncertain- pending ROM +  PELVIC PAIN: none    PREGNANCY COMPLICATIONS: prolonged pregnancy now 41w3d      # Pain Assessment:  Current Pain Score 2019   Patient currently in pain? denies   Tiana s pain level was assessed and she currently denies pain.        REVIEW OF SYSTEMS  =====================  C: NEGATIVE for fever, chills  I: NEGATIVE for worrisome rashes, moles or lesions  E: NEGATIVE for vision changes or irritation  R: NEGATIVE for significant cough or SOB  CV: NEGATIVE for chest pain, palpitations or varicosities  GI: NEGATIVE for nausea, abdominal pain, heartburn, or change in bowel habits  : NEGATIVE for frequency, dysuria, or hematuria  M: NEGATIVE for significant arthralgias or myalgia  N: NEGATIVE for headache, weakness, dizziness or paresthesias  P: NEGATIVE for changes in mood or affect    PROBLEM  LIST  ===============  Patient Active Problem List    Diagnosis Date Noted     Decreased fetal movement 2019     Priority: Medium     Encounter for triage in pregnant patient 2019     Priority: Medium     Labor and delivery, indication for care 2019     Priority: Medium     Research study patient-Has mouthguard on L and D, please give it to patient when admitted to L and D 2019     Priority: Medium     Vitamin D deficiency 2019     Priority: Medium     Vit d= 29, start supplement       Encounter for supervision of normal first pregnancy in third trimester - [facility] CNM 2018     Priority: Medium       FOB Harris and friend, Rhoda (was a [facility] CN pt, too) at birth  Please see birthplan in messages in encounters.   -wants to avoid epidural, do not suggest unless medically recommended  -ok with AMTSL  -wants IA. Wants no directed pushing support           History of anxiety 2011     Priority: Medium       HISTORIES  ==============  ALLERGIES:    No Known Allergies  PAST MEDICAL HISTORY  Past Medical History:   Diagnosis Date     Depressive disorder     Treated for depression in adolescence, not since then.     Tobacco use disorder     Stopped prior to pregnancy     SOCIAL HISTORY  Social History     Socioeconomic History     Marital status: Single     Spouse name: Partner-Harris      Number of children: 0     Years of education: Not on file     Highest education level: Not on file   Occupational History     Occupation:  self-employed     Comment: [employer]   Social Needs     Financial resource strain: Not on file     Food insecurity:     Worry: Not on file     Inability: Not on file     Transportation needs:     Medical: Not on file     Non-medical: Not on file   Tobacco Use     Smoking status: Former Smoker     Years: 14.00     Types: Cigarettes     Start date: 2002     Last attempt to quit: 2016     Years since quittin.9     Smokeless tobacco: Never Used      Tobacco comment: 1-2 cigarettes a day   Substance and Sexual Activity     Alcohol use: No     Frequency: Never     Comment: stopped when found out pregnant     Drug use: No     Sexual activity: Yes     Partners: Male   Lifestyle     Physical activity:     Days per week: Not on file     Minutes per session: Not on file     Stress: Not on file   Relationships     Social connections:     Talks on phone: Not on file     Gets together: Not on file     Attends Anabaptism service: Not on file     Active member of club or organization: Not on file     Attends meetings of clubs or organizations: Not on file     Relationship status: Not on file     Intimate partner violence:     Fear of current or ex partner: Not on file     Emotionally abused: Not on file     Physically abused: Not on file     Forced sexual activity: Not on file   Other Topics Concern      Service No     Blood Transfusions No     Caffeine Concern Not Asked     Comment: 1 large cup of coffee every other day.     Occupational Exposure Not Asked     Hobby Hazards Not Asked     Sleep Concern Not Asked     Stress Concern Not Asked     Weight Concern Not Asked     Special Diet Not Asked     Back Care Not Asked     Exercise Yes     Comment: swimming, and walking     Bike Helmet Not Asked     Seat Belt Yes     Self-Exams Yes     Parent/sibling w/ CABG, MI or angioplasty before 65F 55M? No   Social History Narrative    How much exercise per week? Swimming 3 miles wk, walking dogs-recently decreased.     How much calcium per day? supplement       How much caffeine per day? none    How much vitamin D per day? supplement    Do you/your family wear seatbelts?  Yes    Do you/your family use safety helmets? Yes    Do you/your family use sunscreen? sometimes    Do you/your family keep firearms in the home? No    Do you/your family have a smoke detector(s)? Yes        December 11, 2018 Lizet Anderson LPN         PARTNER: Present and supportive    FAMILY  HISTORY  Family History   Problem Relation Age of Onset     Depression Father      Cancer Other         uncle- esophageal cancer, aunt- leukemia     Diabetes No family hx of      OB HISTORY  OB History    Para Term  AB Living   1 0 0 0 0 0   SAB TAB Ectopic Multiple Live Births   0 0 0 0 0      # Outcome Date GA Lbr Javier/2nd Weight Sex Delivery Anes PTL Lv   1 Current              Prenatal Labs:   Lab Results   Component Value Date    ABO A 2018    RH Pos 2018    AS Neg 2018    HEPBANG Nonreactive 2018    TREPAB Negative 2013    RUQIGG 17 2018    HGB 12.6 2019    HIV Negative 2013       ULTRASOUND(s) reviewed: BPP  with SEBASTIAN 7.4cm 19    EXAM  ============  /73   Temp 97.8  F (36.6  C) (Oral)   Resp 18   LMP 10/13/2018 (Exact Date)   GENERAL APPEARANCE: healthy, alert and no distress  RESP: lungs clear to auscultation - no rales, rhonchi or wheezes  CV: regular rates and rhythm, normal S1 S2, no S3 or S4 and no murmur,and no varicosities  ABDOMEN:  soft, nontender, no epigastric pain  SKIN: no suspicious lesions or rashes  NEURO: Denies headache, blurred vision, other vision changes  PSYCH: mentation appears normal. and affect normal/bright - anxious but coping well  MS/ LEGS: Reflexes normal bilaterally, No clonus bilaterally, No edema and Negative Kelsey's     CONTRACTIONS: irregmild and not felt by patient.  Soft resting tone  FETAL HEART TONES: continuous EFM- baseline 125 with moderate variability and positive accelerations. No decelerations.  NST: REACTIVE  EFW:7.5lb    PELVIC EXAM: deferred until after ROM + came back negative.   Closed/50/-2 posterior, medium  HART SCORE: 3  PRESENTATION:Cephalic by official US for BPP   and Leopold's   BLOOD: no  DISCHARGE: none    ROM: no  ROMPLUS: negative      DIAGNOSIS  ============  34 year old  at 41w3d [facility] T[facility] for decreased fetal movement which has resumed  normal  NO ROM  Not in labor  NST: REACTIVE    Patient Active Problem List   Diagnosis     History of anxiety     Encounter for supervision of normal first pregnancy in third trimester - [facility] CNM     Vitamin D deficiency     Research study patient-Has mouthguard on L and D, please give it to patient when admitted to L and D     Labor and delivery, indication for care     Decreased fetal movement     Encounter for triage in pregnant patient       PLAN  ============  - Reviewed negative ROM + with pt and her partner.   Reviewed recommendations of discharge home undelivered with BPP 7/31/19 and IOL on Thursday 8/1/19 as planned.  Reviewed mauro score of 3 and options for cervical ripening.  Reviewed risk/benefit of prolonged pregnancy vs IOL.  Pt and her partner would prefer privacy to discuss options.     DAT Rodriguez CNM        ADDENDUM 7/30/2019 6:16 AM    Pt desires to be discharged home undelivered with follow up plan of BPP 7/31/19 and IOL 8/1/19 unless clinical picture changes before then.  Reinforced importance of fetal kick count and how/why to contact CNM prn if decreased fetal movement, VB, LOF, labor, or s/s of Pre E.    Reinforced no ROM at present but could have at any time even after discharge home so don't ignore any leaking or fluid, always contact CNM on call.  Reinforced can change mind and have IOL at any time or if BPP not reassuring on 7/31/19 will be recommended IOL at that time.  All pt's and her partner's questions discussed and answered. Pt verbalized understanding of and agreement to plan of care.   Bettie DAUGHERTY, KARIM     Patient

## 2019-07-30 NOTE — PLAN OF CARE
Data: [facility] at 0415.   Reason for maternal/fetal assessment per patient is Rule out rupture of membranes  . Patient is a . Prenatal record reviewed.      OB History    Para Term  AB Living   1 0 0 0 0 0   SAB TAB Ectopic Multiple Live Births   0 0 0 0 0      # Outcome Date GA Lbr Javier/2nd Weight Sex Delivery Anes PTL Lv   1 Current               Medical History:   Past Medical History:   Diagnosis Date     Depressive disorder     Treated for depression in adolescence, not since then.     Tobacco use disorder     Stopped prior to pregnancy   . Gestational Age 41w3d. VSS. Cervix: closed.  Fetal movement present, but decreased per patient.  Patient denies cramping, backache, vaginal discharge, pelvic pressure, UTI symptoms, GI problems, bloody show, vaginal bleeding, edema, headache, visual disturbances, epigastric or URQ pain, abdominal pain, and is unsure if her membranes ruptured.  . Support persons Harris present. ROM plus sent.   Action: Verbal consent for EFM. Triage assessment completed. EFM applied for FHT's baseline of 125 and reactive NST noted. Uterine assessment shows patient is reza Q 6-8 minutes but patient denies feeling contractions. Fetal assessment: Presumed adequate fetal oxygenation documented (see flow record). Patient states she knows how to do fetal kick counts and does not need pamphlet.   Patient instructed to report change in fetal movement, vaginal leaking of fluid or bleeding, abdominal pain, or any concerns related to the pregnancy to her nurse/physician.   Response: KEERTHI Lewis informed of arrival and negative ROM plus. Plan per provider is induce patient or discharge home depending on what the patient decides.  Patient decided to go home and will follow up with her provider on Wednesday for her BPP.  Patient verbalized understanding of education and verbalized agreement with plan. Discharged ambulatory at 0610.

## 2019-07-30 NOTE — TELEPHONE ENCOUNTER
34 year old  at 41w3d contacted CNM on call to state she is worried about fetal movement now ~8.5 hours s/p PROM without labor at term.  Encouraged pt to come directly to L&D for fetal assessment.  Pt agreeable.  Charge RN updated.  Bettie DAUGHERTY, KEERTHI

## 2019-07-30 NOTE — PROVIDER NOTIFICATION
07/30/19 0501   Provider Notification   Provider Name/Title KEERTHI Lewis   Method of Notification At Bedside   Request Evaluate in Person   Notification Reason Patient Arrived

## 2019-07-30 NOTE — TELEPHONE ENCOUNTER
"34 year old  at 41w2d  Called with possible ROM just now.  Had BPP  today with SEBASTIAN 7.4cm, doesn't think at that time she had any trickling or ROM.  Now \"trickling\" when on the toilet after she peed.  +FM. Scant show (redish/pink mucusy discharge). No contractions. GBS negative (expires 19). Has thermometer.  Reviewed risk/benefit of infection with increasing time to delivery s/p ROM - highest after 18- 24 hours.  At this time pt prefers to stay home - will check temp q2 hours.  Will contact CNM and plan to come if in decreased fetal movement, increase in temperature, change in fluid to green or sera red blood, or contractions.  Encouraged pt to come in by [phone] either way for assessment and planning.  Charge RN updated.  Bettie DAUGHERTY CNM  "

## 2019-07-31 LAB — T PALLIDUM AB SER QL: NONREACTIVE

## 2019-07-31 PROCEDURE — 12000001 ZZH R&B MED SURG/OB UMMC

## 2019-07-31 PROCEDURE — 72200001 ZZH LABOR CARE VAGINAL DELIVERY SINGLE

## 2019-07-31 PROCEDURE — 25000132 ZZH RX MED GY IP 250 OP 250 PS 637: Performed by: ADVANCED PRACTICE MIDWIFE

## 2019-07-31 PROCEDURE — 10907ZC DRAINAGE OF AMNIOTIC FLUID, THERAPEUTIC FROM PRODUCTS OF CONCEPTION, VIA NATURAL OR ARTIFICIAL OPENING: ICD-10-PCS | Performed by: ADVANCED PRACTICE MIDWIFE

## 2019-07-31 PROCEDURE — 0KQM0ZZ REPAIR PERINEUM MUSCLE, OPEN APPROACH: ICD-10-PCS | Performed by: ADVANCED PRACTICE MIDWIFE

## 2019-07-31 RX ORDER — HYDROCORTISONE 2.5 %
CREAM (GRAM) TOPICAL 3 TIMES DAILY PRN
Status: DISCONTINUED | OUTPATIENT
Start: 2019-07-31 | End: 2019-08-02 | Stop reason: HOSPADM

## 2019-07-31 RX ORDER — IBUPROFEN 800 MG/1
800 TABLET, FILM COATED ORAL EVERY 6 HOURS PRN
Status: DISCONTINUED | OUTPATIENT
Start: 2019-07-31 | End: 2019-08-02 | Stop reason: HOSPADM

## 2019-07-31 RX ORDER — OXYTOCIN 10 [USP'U]/ML
10 INJECTION, SOLUTION INTRAMUSCULAR; INTRAVENOUS
Status: DISCONTINUED | OUTPATIENT
Start: 2019-07-31 | End: 2019-08-02 | Stop reason: HOSPADM

## 2019-07-31 RX ORDER — OXYTOCIN/0.9 % SODIUM CHLORIDE 30/500 ML
100 PLASTIC BAG, INJECTION (ML) INTRAVENOUS CONTINUOUS
Status: DISCONTINUED | OUTPATIENT
Start: 2019-07-31 | End: 2019-08-02 | Stop reason: HOSPADM

## 2019-07-31 RX ORDER — BISACODYL 10 MG
10 SUPPOSITORY, RECTAL RECTAL DAILY PRN
Status: DISCONTINUED | OUTPATIENT
Start: 2019-08-02 | End: 2019-08-02 | Stop reason: HOSPADM

## 2019-07-31 RX ORDER — ACETAMINOPHEN 325 MG/1
650 TABLET ORAL EVERY 4 HOURS PRN
Status: DISCONTINUED | OUTPATIENT
Start: 2019-07-31 | End: 2019-08-02 | Stop reason: HOSPADM

## 2019-07-31 RX ORDER — AMOXICILLIN 250 MG
2 CAPSULE ORAL 2 TIMES DAILY
Status: DISCONTINUED | OUTPATIENT
Start: 2019-07-31 | End: 2019-08-02 | Stop reason: HOSPADM

## 2019-07-31 RX ORDER — LANOLIN 100 %
OINTMENT (GRAM) TOPICAL
Status: DISCONTINUED | OUTPATIENT
Start: 2019-07-31 | End: 2019-08-02 | Stop reason: HOSPADM

## 2019-07-31 RX ORDER — OXYTOCIN/0.9 % SODIUM CHLORIDE 30/500 ML
340 PLASTIC BAG, INJECTION (ML) INTRAVENOUS CONTINUOUS PRN
Status: DISCONTINUED | OUTPATIENT
Start: 2019-07-31 | End: 2019-08-02 | Stop reason: HOSPADM

## 2019-07-31 RX ORDER — NALOXONE HYDROCHLORIDE 0.4 MG/ML
.1-.4 INJECTION, SOLUTION INTRAMUSCULAR; INTRAVENOUS; SUBCUTANEOUS
Status: DISCONTINUED | OUTPATIENT
Start: 2019-07-31 | End: 2019-08-02 | Stop reason: HOSPADM

## 2019-07-31 RX ORDER — AMOXICILLIN 250 MG
1 CAPSULE ORAL 2 TIMES DAILY
Status: DISCONTINUED | OUTPATIENT
Start: 2019-07-31 | End: 2019-08-02 | Stop reason: HOSPADM

## 2019-07-31 RX ADMIN — SENNOSIDES AND DOCUSATE SODIUM 1 TABLET: 8.6; 5 TABLET ORAL at 20:48

## 2019-07-31 RX ADMIN — SENNOSIDES AND DOCUSATE SODIUM 1 TABLET: 8.6; 5 TABLET ORAL at 09:56

## 2019-07-31 NOTE — PROGRESS NOTES
Tiana Soto      MRN#: [id number]  Age: 34 year old      YOB: 1985      PPD #0 S/P    Patient feels well and is without issue, baby is rooming in  Breast feeding status:initiated  Complications since 2 hours post delivery: None      Has partner and multiple supports at bedside.  Will try to get some sleep tonight between feeds.  Considering PPD#1 discharge in late afternoon as she has many supports at home as well.     SIGNIFICANT PROBLEMS:  Patient Active Problem List    Diagnosis Date Noted      (normal spontaneous vaginal delivery) 2019     Priority: Medium     Decreased fetal movement 2019     Priority: Medium     Encounter for triage in pregnant patient 2019     Priority: Medium     Labor and delivery, indication for care 2019     Priority: Medium     Research study patient-Has mouthguard on L and D, please give it to patient when admitted to L and D 2019     Priority: Medium     Did use mouthguard during entire second stage.       Vitamin D deficiency 2019     Priority: Medium     Vit d= 29, start supplement       Encounter for supervision of normal first pregnancy in third trimester - [facility] CN[facility] 2018     Priority: Medium       FOB Harris and friend, Rhoda (was a [facility] CNM pt, too) at birth  Please see birthplan in messages in encounters.   -wants to avoid epidural, do not suggest unless medically recommended  -ok with AMTSL  -wants IA. Wants no directed pushing support           History of anxiety 2011     Priority: Medium         PE:    Vitals:    19 0530 19 0545 19 0933 19 1350   BP: 107/59 119/64 108/70 113/83   Resp: 18 18 18 18   Temp:   98.4  F (36.9  C) 98.7  F (37.1  C)   TempSrc:   Oral Oral       NAD  Additional exam deferred        Postpartum hemoglobin  To be drawn 19    Lab Results   Component Value Date    ABO A 2019       Lab Results   Component Value Date    RH Pos 2019     Rubella status  - IMMUNE      Assessment/Plan-   Stable Post-partum day #0  Complications:none  Breastfeeding  Rhophylac not indicated      Routine Postpartum Management      Bettie DAUGHERTY, CNM

## 2019-07-31 NOTE — L&D DELIVERY NOTE
Delivery Summary    Tiana Soto MRN# [id number]   Age: 34 year old YOB: 1985     ASSESSMENT & PLAN:   DELIVERY NOTE:  Brief Labor Course: Patient began having regular contractions on 19 around 1600 progressively became stronger and more coordinated around 1900.  Presented to labor and delivery at  and had spontaneous rupture of membranes upon arrival to unit for thick meconium stained fluid.  Patient made steady progress with spontaneous labor.  Delivery Note:   Patient noted to have strong urge to bear down around 0145 and was noted to be complete.  Was encouraged to begin active pushing efforts at that time.  Mouthguard inserted at start of second stage. She did use different positions for second stage, but made the most progress with tug of war position using squatting bar and a sheet.  As infant was , NICU called to be present for delivery.  Vigorous female infant born with loose nuchal cord that was not reduced prior to delivery.  Cord was doubly clamped and cut by the father of the baby 2 minutes after delivery.  IV Pitocin initiated after delivery of baby but prior to delivery of placenta.  Placenta delivered spontaneously intact with 3 vessel cord via Schultze mechanism.  Patient desired to keep placenta.  Perineum inspected and second degree laceration identified and repaired under local anesthetic in the usual fashion using 3-0 Vicryl.  Mother and infant stable in recovery.  IUP at 41 weeks gestation delivered on 2019.     delivery of a viable Female infant.  Weight : pending  Apgars of 8 at 1 minute and 9 at 5 minutes.  Labor was spontaneous.  Medications administered  in labor:  Pain Rx None; Antibiotics No  Perineum: 2nd degree  Placenta-mechanism: spontaneous, intact,  with a 3 vessel cord. IV oxytocin was given After delivery of baby Before delivery of placenta  Quantitative Blood Loss was 100cc.  Complications of labor and delivery: Meconium stained fluid  and Nuchal cord  Anticipated Discharge Date: 2019  Birth attendants: KEERTHI Burnett APRN CNM       Labor Event Times    Labor onset date:  19 Onset time:   8:35 PM   Start pushing date/time:  2019 0145      Labor Events     labor?:  No  Labor Type:  Spontaneous  Predominate monitoring during 1st stage:  continuous electronic fetal monitoring     Rupture date/time: 19   Rupture type:  Artificial Rupture of Membranes  Fluid color:  Meconium  Fluid odor:  Normal     1:1 continuous labor support provided by?:   Labor partogram used?:  no      Delivery/Placenta Date and Time    Delivery Date:  19 Delivery Time:   3:52 AM   Placenta Date/Time:  2019  4:01 AM     Vaginal Counts     Initial count performed by 2 team members:   Two Team Members   KEERTHI Mazariegos RN       Needles Suture Smyer Sponges Instruments   Initial counts 2  5    Added to count  1     Final counts 2 1 5    Placed during labor Accounted for at the end of labor   No    No    No     Final count performed by 2 team members:   Two Team Members   KEERTHI Mazariegos RN      Final count correct?:  Yes     Apgars    Living status:  Living   1 Minute 5 Minute 10 Minute 15 Minute 20 Minute   Skin color: 1  1       Heart rate: 2  2       Reflex irritability: 2  2       Muscle tone: 2  2       Respiratory effort: 1  2       Total: 8  9       Apgars assigned by:  JACQUELINE JUAN 1 MIN,CARLOS DE LA CRUZ 5 MIN     Cord    Vessels:  3 Vessels Complications:  None   Cord Blood Disposition:  Lab Gases Sent?:  No      [place] Resuscitation    Methods:  None  [place] Care at Delivery:  Called to attend the delivery due to meconium stained fluid. Infant delivered with spontaneous cry and respirations. NICU team not needed and dismissed.     DAT Salter, NNP-BC 2019 3:54 AM      Output in Delivery Room:  Stool     Skin to Skin and Feeding Plan    Skin to skin initiation date/time:  1/7/1841    Skin to skin with:  Mother  Skin to skin end date/time:     How do you plan to feed your baby:  Breastfeeding     Labor Events and Shoulder Dystocia    Fetal Tracing Prior to Delivery:  Category 2  Fetal Tracing Comments:  Variable decelerations to 90's during second stage  Shoulder dystocia present?:  Neg     Delivery (Maternal) (Provider to Complete) ([id number])    Episiotomy:  None  Perineal lacerations:  2nd Repaired?:  Yes   Vaginal laceration?:  No    Cervical laceration?:  No       Blood Loss  Mother: JILL Soto #[id number]   Start of Mother's Information    IO Blood Loss  07/30/19 2035 - 07/31/19 0443    None           End of Mother's Information  Mother: JILL Soto #[id number]         Delivery - Provider to Complete ([id number])    Delivering clinician:  Thu Mazariegos APRN CNM  CNM Care:  Exclusive CNM care in labor  Attempted Delivery Types (Choose all that apply):  Spontaneous Vaginal Delivery  Delivery Type (Choose the 1 that will go to the Birth History):  Vaginal, Spontaneous   Other personnel:   Provider Role   Thu Mazariegos APRN CNM Midwife   Indu Johnson RN Registered Nurse   Roseanne Green RN          Placenta    Delayed Cord Clamping:  Done  Date/Time:  7/31/2019  4:01 AM  Removal:  Spontaneous  Comments:  Intact via Schultze mechanism with 3 vessel cord.  Disposition:  Patient possesion     Anesthesia    Method:  None          Presentation and Position    Presentation:  Vertex  Position:  Middle Occiput Anterior           DAT Davis CNM

## 2019-07-31 NOTE — PROGRESS NOTES
Brief progress note:  Called by charge RN to room for FHT deceleration. Patient is 35 y/o  who presented after SROM w mec. Labor course has been uneventful with tracing cat I and reactive until 2205, when patient ambulated to bathroom and had prolonged decel with john of 60 BPM lasting 5-6 minutes. Resolved with maternal repositioning. SVE by Thu Mazariegos CNM at that time. Cervix changed from 4 to 5 cm dilated. Patient receiving 500cc fluid flush, is laying on side, and has supplemental oxygen in place. Tracing at this time has baseline of 120, with moderate variability, accelerations present, and no decelerations since prolonged decel around 2205. CNM service will continue to manage patient at this time, and will notify OB team if any additional issues arise.     Elsa Carrera MD  OB/GYN PGY-2  19 10:50 PM

## 2019-07-31 NOTE — PROGRESS NOTES
Data: Tiana Soto transferred to [id number] via wheelchair at 0615. Baby transferred via parent's arms.  Action: Receiving unit notified of transfer: Yes. Patient and family notified of room change. Belongings sent to receiving unit. Accompanied by Registered Nurse. Oriented patient to surroundings. Call light within reach. ID bands double-checked with receiving RN.  Response: Patient tolerated transfer and is stable.

## 2019-07-31 NOTE — PROGRESS NOTES
Labor progress note    S:  Patient reporting some increased pelvic pressure.  Requesting cervical exam at this time.      O:  Blood pressure 130/76, temperature 98.3  F (36.8  C), temperature source Oral, resp. rate 18, last menstrual period 10/13/2018.  General appearance: uncomfortable with contractions.  Contractions: Every 1-3 minutes. 30-70 seconds duration.  Palpate: strong.  FHT: Baseline 120 with moderate variability. Accelerations are present. Occasional variable decelerations present.  ROM: thick meconium fluid. Membranes have been ruptured for 4 hours.  Pelvic exam: 5.5/ 100%/ Mid/ soft/ -1    Pitocin- none,  Antibiotics- none      A:  IUP @ 41w4d active labor   Fetal Heart rate tracing Category category one  GBS- negative  Patient Active Problem List   Diagnosis     History of anxiety     Encounter for supervision of normal first pregnancy in third trimester - [facility] KEERTHI     Vitamin D deficiency     Research study patient-Has mouthguard on L and D, please give it to patient when admitted to L and D     Labor and delivery, indication for care     Decreased fetal movement     Encounter for triage in pregnant patient         P:  comfort measures prn-encouraged position changes and upright mobility  Anticipate      DAT Davis CNM

## 2019-07-31 NOTE — PROGRESS NOTES
Labor progress note    S:  Patient breathing well with contractions, significant other and  at bedside.  Called to room to evaluate FHT's.      O:  Blood pressure 134/85, temperature 97.9  F (36.6  C), temperature source Oral, last menstrual period 10/13/2018.  General appearance: uncomfortable with contractions.  Contractions: Every 1-5 minutes. 40-80 seconds duration.  Palpate: strong.  FHT: Patient up to void at 2205, deceleration noted to begin around 2207 with john to 50-60 x4 minutes.  Patient assisted back to bed, IV started and oxygen applied.  FHT's with slow return to baseline of 120 over then next 9 minutes.  Now with moderate variability at baseline 120.  ROM: thick meconium fluid. Membranes have been ruptured for 2 hours.  Pelvic exam: 5/ 80%/ Mid/ average/ -1    Pitocin- none,  Antibiotics- none      A:  IUP @ 41w3d early labor   Fetal Heart rate tracing Category two, now category one  GBS- negative  Patient Active Problem List   Diagnosis     History of anxiety     Encounter for supervision of normal first pregnancy in third trimester - S CN     Vitamin D deficiency     Research study patient-Has mouthguard on L and D, please give it to patient when admitted to L and D     Labor and delivery, indication for care     Decreased fetal movement     Encounter for triage in pregnant patient         P:  reevaluate in 2-4 hours/PRN  Continue close fetal surveillance   IV to saline lock after fluid bolus.  DAT Davis CNM

## 2019-07-31 NOTE — PROGRESS NOTES
Vaginal Delivery Note   of viable Female with Thu Mazariegos CNM in attendance.  NICU/Nursery RN Roseanne present.  Infant with spontaneous cry, to mother's abdomen, dried and stimulated.  APGAR at 1 minute:  8 and APGAR at 5 minutes:  9.  Placenta delivered with out complication, second degree laceration, with repair, mayela cares provided.  Mother and baby in stable condition.

## 2019-07-31 NOTE — PLAN OF CARE
VSS. Postpartum check WDL. Denied pain and declined pain medications. Up ad darrell. Tolerating regular diet. Voiding without difficulty. Passing gas. Soaked in tub and showered. Breastfeeding with moderate assist for latch and positioning. Able to hand express drops of colostrum independently.  at bedside and supportive. Bonding well with baby. Continue cares.

## 2019-07-31 NOTE — PROVIDER NOTIFICATION
07/30/19 2050   Provider Notification   Provider Name/Title Thu Mazariegos   Method of Notification In Department   Request Evaluate in Person   Notification Reason Patient Arrived   Provider informed of pt arrival. VSS. Ctx Q3-4 minutes. Palpate moderate. Leaking mec stained fluid. SROM at 2035. Pt hoping for unmedicated birth. Partner and support person at bedside.

## 2019-07-31 NOTE — H&P
"ADMIT NOTE  =================  41w3d    Tiana Soto is a 34 year old female with an Patient's last menstrual period was 10/13/2018 (exact date). and Estimated Date of Delivery: 2019 is [facility] 2019 at 9:04 PM with in early labor, with rupture of membranes upon arrival to unit.     HPI  ================  Patient states that she continued to have contractions throughout the day, didn't sleep.  Contractions became more coordinated and stronger around 1600 this afternoon.   Contractions- strong  Fetal movement- active  ROM- patient felt \"pop\" when she arrived to the unit at , moderate amount of thick meconium stained fluid noted   Vaginal bleeding- small, red and brown  GBS- negative  FOB- is involved, Harris at bedside  Other labor support- Ricardo present    Weight gain- 194 - 169 lbs, Total weight gain- 25 lbs  Height- 65\"  BMI- 28  First prenatal visit at 8 weeks, Total visits- 13    PROBLEM LIST  =================  Patient Active Problem List    Diagnosis Date Noted     Decreased fetal movement 2019     Priority: Medium     Encounter for triage in pregnant patient 2019     Priority: Medium     Labor and delivery, indication for care 2019     Priority: Medium     Research study patient-Has mouthguard on L and D, please give it to patient when admitted to L and D 2019     Priority: Medium     Vitamin D deficiency 2019     Priority: Medium     Vit d= 29, start supplement       Encounter for supervision of normal first pregnancy in third trimester - [facility] CN[facility] 2018     Priority: Medium       FOB Harris and friend, Rhoda (was a [facility] CNM pt, too) at birth  Please see birthplan in messages in encounters.   -wants to avoid epidural, do not suggest unless medically recommended  -ok with AMTSL  -wants IA. Wants no directed pushing support           History of anxiety 2011     Priority: Medium       HISTORIES  ============  No Known Allergies  Past " Medical History:   Diagnosis Date     Depressive disorder     Treated for depression in adolescence, not since then.     Tobacco use disorder     Stopped prior to pregnancy     Past Surgical History:   Procedure Laterality Date     NO HISTORY OF SURGERY     .  Family History   Problem Relation Age of Onset     Depression Father      Cancer Other         uncle- esophageal cancer, aunt- leukemia     Diabetes No family hx of      Social History     Tobacco Use     Smoking status: Former Smoker     Years: 14.00     Types: Cigarettes     Start date: 2002     Last attempt to quit: 2016     Years since quittin.9     Smokeless tobacco: Never Used     Tobacco comment: 1-2 cigarettes a day   Substance Use Topics     Alcohol use: No     Frequency: Never     Comment: stopped when found out pregnant     OB History    Para Term  AB Living   1 0 0 0 0 0   SAB TAB Ectopic Multiple Live Births   0 0 0 0 0      # Outcome Date GA Lbr Javier/2nd Weight Sex Delivery Anes PTL Lv   1 Current                 LABS:   ===========  Prenatal Labs:  Rhogam not indicated   Lab Results   Component Value Date    ABO A 2018    RH Pos 2018    AS Neg 2018    HEPBANG Nonreactive 2018    TREPAB Negative 2013    RUQIGG 17 2018    HGB 12.6 2019    HIV Negative 2013     Rubella immune  Lab Results   Component Value Date    GBS Negative 2019     Other labs:  Results for orders placed or performed during the hospital encounter of 19 (from the past 24 hour(s))   Rupture of Fetal Membranes by ROM Plus   Result Value Ref Range    Rupture of Fetal Membranes by ROM Plus Negative NEG^Negative       ROS  =========  Pt denies significant respiratory, cardiovacular, GI, or muscular/skeletalcomplaints.    See RN data base ROS.       PHYSICAL EXAM:  ===============  /85   Temp 97.9  F (36.6  C) (Oral)   LMP 10/13/2018 (Exact Date)   General appearance: uncomfortable with  contractions  GENERAL APPEARANCE: healthy, alert and no distress  RESP: lungs clear to auscultation - no rales, rhonchi or wheezes  CV: regular rates and rhythm, normal S1 S2, no S3 or S4 and no murmur,and no varicosities  ABDOMEN:  soft, nontender, no epigastric pain  SKIN: no suspicious lesions or rashes  NEURO: Denies headache, blurred vision, other vision changes  PSYCH: mentation appears normal. and affect normal/bright  Legs: Reflexes normal bilaterally and 1+ edema      Abdomen: gravid, vertex fetus per Leopold's, non-tender between contractions.   Cephalic presentation confirmed by palpable suture lines  EFW-  8 lbs.   CONTACTIONS: strong and every 3-4 minutes  FETAL HEART TONES: continuous EFM- baseline 130 with moderate variability and positive accelerations. No decelerations.  PELVIC EXAM: 4/ 80%/ Mid/ average/ -1   BLOODY SHOW: yes brown/red   ROM:yes, moderate, thick meconium      # Pain Assessment:  Current Pain Score 7/30/2019   Patient currently in pain? yes   - iTana is experiencing pain due to contractions. Pain management was discussed with Tiana and her friend and significant other and the plan was created in a collaborative fashion.  Tiana's response to the current recommendations: engaged  - hoping to avoid pain medication, aware of options        ASSESSMENT:  ==============  IUP @ 41w3d admitted in early labor and rupture of membranes   NST REACTIVE  Fetal Heart Rate - category one  GBS- negative    Patient Active Problem List   Diagnosis     History of anxiety     Encounter for supervision of normal first pregnancy in third trimester - [facility]     Vitamin D deficiency     Research study patient-Has mouthguard on L and D, please give it to patient when admitted to L and D     Labor and delivery, indication for care     Decreased fetal movement     Encounter for triage in pregnant patient       PLAN:  ===========  Admit - see IP orders  Patient consents to lab work on admission  Discussed  continuous monitoring due to meconium stained fluid and contraindication to waterbirth-patient agrees to continuous monitoring and may consider hydrotherapy.  Ambulation, hydration, position changes, birthing ball and tub options to facilitate labor reviewed with pt .  Anticipate   DAT DavisM

## 2019-07-31 NOTE — LACTATION NOTE
This note was copied from a baby's chart.  Consult for: First time mom breastfeeding.    History: Vaginal delivery this morning @ 41w4d, AGA infant @ 7# 11.8 oz. birthweight.  Maternal history of Vitamin D deficiency, anxiety & treated for depression as a child, nothing recent.     Breast exam of mom: Soft, symmetrical with intact, everted nipples bilaterally. Savana reports early tenderness & bilateral breast growth @ end of pregnancy.      Oral exam of baby:  Excellent extension and lift, sticking tongue out well beyond lips cueing to feed.     Feeding assessment:  Mom in semi laid back position, had infant latched on comfortably upon arrival having had RN help to latch.  Savana reports she's gaining some confidence but appreciating Gretchen's help to feel better able to latch. Baby came off a few times, support mom with latch practice in both cross cradle and laid back positions, able to point out nutritive sucking and swallows.     Education provided: Discussed positioning & using pillows/blankets for support, anatomy of breast and infant mouth for feedings, different ways to get and maintain deep latch, breast compressions prn during feedings to enhance milk transfer, point out nutritive suck and how to hear swallows, benefits of skin to skin and feeding on cue, supply and demand, benefits of breast massage and hand expression (mom reports she is able to get drops on her own), how to tell when satiated and if getting enough, what to expect in the coming days and preventing engorgement. Reviewed breastfeeding log and resource list adding in [email or web address] and additional community resources. Savana is already a member with [facility], attending meetings during pregnancy, and plans to seek out [employer] groups. They plan pediatric follow up at [facility], will inquire about LC support there and use resource list prn.     Plan: Frequent skin to skin, breastfeed on cue with goal of 8 to 12 feedings in 24 hours, watch for  early cues. Hand express after feedings and spoon feed results until milk is in. Follow up with outpatient lactation consultant within a week of discharge for support with first time breastfeeding.

## 2019-08-01 LAB — HGB BLD-MCNC: 11.8 G/DL (ref 11.7–15.7)

## 2019-08-01 PROCEDURE — 85018 HEMOGLOBIN: CPT | Performed by: ADVANCED PRACTICE MIDWIFE

## 2019-08-01 PROCEDURE — 36415 COLL VENOUS BLD VENIPUNCTURE: CPT | Performed by: ADVANCED PRACTICE MIDWIFE

## 2019-08-01 PROCEDURE — 12000001 ZZH R&B MED SURG/OB UMMC

## 2019-08-01 PROCEDURE — 25000132 ZZH RX MED GY IP 250 OP 250 PS 637: Performed by: ADVANCED PRACTICE MIDWIFE

## 2019-08-01 RX ADMIN — SENNOSIDES AND DOCUSATE SODIUM 1 TABLET: 8.6; 5 TABLET ORAL at 07:58

## 2019-08-01 ASSESSMENT — ACTIVITIES OF DAILY LIVING (ADL)
RETIRED_EATING: 0-->INDEPENDENT
RETIRED_COMMUNICATION: 0-->UNDERSTANDS/COMMUNICATES WITHOUT DIFFICULTY
DRESS: 0-->INDEPENDENT
COGNITION: 0 - NO COGNITION ISSUES REPORTED
SWALLOWING: 0-->SWALLOWS FOODS/LIQUIDS WITHOUT DIFFICULTY
FALL_HISTORY_WITHIN_LAST_SIX_MONTHS: NO
TRANSFERRING: 0-->INDEPENDENT
TOILETING: 0-->INDEPENDENT
BATHING: 0-->INDEPENDENT
AMBULATION: 0-->INDEPENDENT

## 2019-08-01 NOTE — PLAN OF CARE
The patient's pulse was 105 bpm. All other vitals stable. She was encouraged to keep drinking water. She complains of uterine cramping with nursing but denies pain medication. She is using hot packs and she declined the use of our essential oils sticks as she has her own essential oils. She is breastfeeding well with minimal assistance. She has been exhausted as she has not slept yet. Sleep was encouraged. Continue to monitor pulse and provide postpartum cares and support as needed.

## 2019-08-01 NOTE — PLAN OF CARE
Data: Vital signs within normal limits. Postpartum checks within normal limits - see flow record. Patient eating and drinking normally. Patient able to empty bladder independently and is up ambulating. No apparent signs of infection. 2nd degree laceration  healing well. Patient performing self cares and is able to care for infant.  Action: Patient declined medication this shift. Complained of mild cramping with feedings. Heat packs utilized during this shift. See MAR. Patient reassessed within 1 hour after each intervention given and pain was improved - patient stated she was comfortable. Patient education done about pain management. See flow record.  Response: Positive attachment behaviors observed with infant. Support persons Harris present.   Plan: Anticipate discharge on 8/2.

## 2019-08-01 NOTE — PROGRESS NOTES
Post Partum Rounds Note    Tiana Soto MRN# [id number]   Age: 34 year old YOB: 1985     Date of Admission:  2019    Home clinic: [facility]    SIGNIFICANT PROBLEMS:  Patient Active Problem List   Diagnosis     History of anxiety     Encounter for supervision of normal first pregnancy in third trimester - [facility]     Vitamin D deficiency     Research study patient-Has mouthguard on L and D, please give it to patient when admitted to L and D     Labor and delivery, indication for care     Decreased fetal movement     Encounter for triage in pregnant patient      (normal spontaneous vaginal delivery)      DELIVERY NOTE:  Brief Labor Course: Patient began having regular contractions on 19 around 1600 progressively became stronger and more coordinated around 1900.  Presented to labor and delivery at 203 and had spontaneous rupture of membranes upon arrival to unit for thick meconium stained fluid.  Patient made steady progress with spontaneous labor.  Delivery Note:   Patient noted to have strong urge to bear down around 0145 and was noted to be complete.  Was encouraged to begin active pushing efforts at that time.  Mouthguard inserted at start of second stage. She did use different positions for second stage, but made the most progress with tug of war position using squatting bar and a sheet.  As infant was , NICU called to be present for delivery.  Vigorous female infant born with loose nuchal cord that was not reduced prior to delivery.  Cord was doubly clamped and cut by the father of the baby 2 minutes after delivery.  IV Pitocin initiated after delivery of baby but prior to delivery of placenta.  Placenta delivered spontaneously intact with 3 vessel cord via Schultze mechanism.  Patient desired to keep placenta.  Perineum inspected and second degree laceration identified and repaired under local anesthetic in the usual fashion using 3-0 Vicryl.  Mother and infant  "stable in recovery.  IUP at 41 weeks gestation delivered on 2019.     delivery of a viable Female infant.  Weight : pending  Apgars of 8 at 1 minute and 9 at 5 minutes.  Labor was spontaneous.  Medications administered  in labor:  Pain Rx None; Antibiotics No  Perineum: 2nd degree  Placenta-mechanism: spontaneous, intact,  with a 3 vessel cord. IV oxytocin was given After delivery of baby Before delivery of placenta  Quantitative Blood Loss was 100cc.  Complications of labor and delivery: Meconium stained fluid and Nuchal cord  Anticipated Discharge Date: 2019  Birth attendants: Thu Mazariegos CNM    INTERVAL HISTORY:  /60   Temp 98.1  F (36.7  C) (Oral)   Resp 18   LMP 10/13/2018 (Exact Date)    Pt stable, baby is rooming in. Baby girl, \"Elle.\"  Breast feeding status:initiated and well established  Complications since 2 hours post delivery: None  Patient is is not tolerating activity well, patient complains of increased pain with walking--may decide to stay overnight d/t this; she is not taking Tylenol or Ibuprofen, Voiding without difficulty, cramping only when breastfeeding and relieved by hot pack, lochia is decreasing and patient denies clots.  Perineal pain is is relieved by ice pack.  The perineum laceration is well approximated.    Postpartum hemoglobin   Recent Labs   Lab 19  0647 19  2115   HGB 11.8 13.5      Blood type   Lab Results   Component Value Date    ABO A 2019    RH Pos 2019      Rubella status Immune    History of depression: Yes. Postpartum depression warning signs reviewed.    ASSESSMENT:    Breasts: soft, nontender, filling  Nipples: intact, nontender  Fundus: firm, U/1, midline, nontender  Abdomen: soft, nontender  Lochia: rubra, scant  Perineum: well approximated  Legs: trace edema bilateral      PLAN:  Normal postpartum exam , Stable Post-partum day #1  Complications: hx of depression- plan 2 week visit  Home Visit Ordered- Yes    Postpartum " "warning s/s reviewed, including bleeding/clots, fever, mastitis, or depression, Kegels/ crunches.    Pt has not been taking tylenol or ibuprofen; reports she is sore which makes it hard to ambulate. Encouraged tylenol and ibuprofen to facilitate management of discomfort, decrease swelling and enable more mobility.      Continue prenatal vitamins.  Discharge medications ordered- none desired; pt has tylenol, ibuprofen and stool softener at home    Birthcontrol planned:Condoms    Pt desires discharge home tomorrow.  Plan d/c home tomorrow    RTC for 6 week pp visit, Recommended 2 week pp check       Review of your medicines      CONTINUE these medicines which have NOT CHANGED      Dose / Directions   cholecalciferol 5000 units Tabs tablet  Commonly known as:  vitamin D3      Take by mouth daily  Refills:  0     fish oil-omega-3 fatty acids 1000 MG capsule      Dose:  2 g  Take 2 g by mouth daily  Refills:  0     PRENATAL+DHA PO      Dose:  2 tablet  Take 2 tablets by mouth daily  Refills:  0           I, MARSHA Mitchell, am serving as a scribe to document services personally performed by CNM based on the provider's statements to me.\" - MARSHA Mitchell    The encounter was performed by me and scribed by the SNM. The scribed note accurately reflects my personal services and decisions made by me.     DAT Mora CNM           "

## 2019-08-01 NOTE — LACTATION NOTE
This note was copied from a baby's chart.  Follow Up Consult    Maternal Assessment: Savana denies discomfort and feels like she is becoming more independent with positioning and latching infant.   Savana has been hand expressing colostrum after feedings and will start pumping this afternoon after [employer] pumps are delivered to unit from [facility].    Infant Assessment: Infant has been breastfeeding on cue, every 2-3 hours per parents. Her weight at 24 hours was -4.7% of her birthweight at 7lb 6oz. She has not had a stool since last evening and had uric acid crystals in her urine. Her 24 hour bili and repeat bili were both scott-intermediate risk. Due to these factors pediatrician recommended supplementation.     Feeding Assessment: Infant had just  and took a supplement of 10 ml of donor milk via finger feeding.     Education: benefits of hand expression and pumping, outpatient donor milk resources, indications for supplementation and outpatient lactation resources.     Plan: Continue breastfeeding on cue with a goal of 8-12 feedings per day. Continue supplementation with donor milk and/or expressed milk.  Encourage frequent skin to skin and breast massage before feedings. Encourage hand expression after every feeding a pumping after at least every other feeding due to supplementation needs.  At discharge, review discharge feeding plan and follow up recommendations.

## 2019-08-01 NOTE — PLAN OF CARE
VSS. Postpartum checks WDL. Up independently, voiding without difficulty. Had looser BM today so declined stool softener this evening. Denies pain, declines ibuprofen or tylenol. Breastfeeding well, able to independently latch infant. Has some bruising on both nipples. Pumping/H.E. After feeds and then supplementing with donor breast milk as well d/t infant's elevated bilirubin levels. Significant other supportive at the bedside.

## 2019-08-01 NOTE — PLAN OF CARE
VSS. Postpartum check WDL. Cramping pain managed with hot packs; declined pain medications. Up ad darrell. Tolerating regular diet. Voiding without difficulty. Breastfeeding baby on demand.  at bedside and supportive. Bonding well with baby. Continue cares.

## 2019-08-02 VITALS
SYSTOLIC BLOOD PRESSURE: 133 MMHG | HEART RATE: 89 BPM | DIASTOLIC BLOOD PRESSURE: 83 MMHG | RESPIRATION RATE: 18 BRPM | OXYGEN SATURATION: 95 % | TEMPERATURE: 97.7 F

## 2019-08-02 PROCEDURE — 25000132 ZZH RX MED GY IP 250 OP 250 PS 637: Performed by: ADVANCED PRACTICE MIDWIFE

## 2019-08-02 RX ADMIN — SENNOSIDES AND DOCUSATE SODIUM 1 TABLET: 8.6; 5 TABLET ORAL at 08:26

## 2019-08-02 NOTE — PLAN OF CARE
Data: Vital signs within normal limits. Postpartum checks within normal limits - see flow record. Patient eating and drinking normally. Patient able to empty bladder independently and is up ambulating. No apparent signs of infection. Perineum  healing well. Patient performing self cares and is able to care for infant.  Action: Patient medicated during the shift for pain and cramping. See MAR. Patient reassessed within 1 hour after each medication and pain was improved - patient stated she was comfortable. Patient education done about discharge instructions, follow up, pre-e warning signs. See flow record.  Response: Positive attachment behaviors observed with infant. Support persons  present.   Plan: Anticipate discharge today at 1330.

## 2019-08-02 NOTE — PLAN OF CARE
Data: Vital signs within normal limits. Postpartum checks within normal limits - see flow record. Patient eating and drinking normally. Patient able to empty bladder independently and is up ambulating. No apparent signs of infection. Patient performing self cares and is able to care for infant.  Action: Patient declined pain medication, used heat packs for cramping. Education done about proper latch and positioning. She is pumping and supplementing with donor breast milk.  Response: Positive attachment behaviors observed with infant. Spouse present, supportive with cares.   Plan: Anticipate discharge today.

## 2019-08-02 NOTE — DISCHARGE SUMMARY
Post Partum Note and Discharge Summary  SIGNIFICANT PROBLEMS:  Patient Active Problem List    Diagnosis Date Noted      (normal spontaneous vaginal delivery) 2019     Priority: Medium     Decreased fetal movement 2019     Priority: Medium     Encounter for triage in pregnant patient 2019     Priority: Medium     Labor and delivery, indication for care 2019     Priority: Medium     Research study patient-Has mouthguard on L and D, please give it to patient when admitted to L and D 2019     Priority: Medium     Did use mouthguard during entire second stage.       Vitamin D deficiency 2019     Priority: Medium     Vit d= 29, start supplement       Encounter for supervision of normal first pregnancy in third trimester - [facility] CN[facility] 2018     Priority: Medium       FOB Harris and friend, Rhoda (was a [facility] CN pt, too) at birth  Please see birthplan in messages in encounters.   -wants to avoid epidural, do not suggest unless medically recommended  -ok with AMTSL  -wants IA. Wants no directed pushing support           History of anxiety 2011     Priority: Medium       ADMISSION DIAGNOSIS:  Indication for care in Labor and Delivery    DISCHARGE DIAGNOSIS:    Breastfeeding mother    HOSPITAL COURSE:  41w6d  Tiana Soto is a 34 year old female     Pt [facility] on 2019  8:36 PM in early labor. She had spontaneous rupture of membranes and progressed to complete dilation.  She delivered a vigorous female infant at 0352 on 2019 over a 2nd degree laceration.  She had a normal postpartum course without complications.    INTERVAL HISTORY:  /76   Pulse 75   Temp 98.1  F (36.7  C) (Oral)   Resp 16   LMP 10/13/2018 (Exact Date)   SpO2 95%   Pt stable, baby is rooming in. Some concern for hyperbilirubinemia.  Breast feeding status:initiated, well established and supplementing with DBM.  # Discharge Pain Plan:   - During her hospitalization,  Tiana experienced pain due to labor and birth.  The pain plan for discharge was discussed with Tiana and her significant other and the plan was created in a collaborative fashion.    - Pharmacologic adjuvants:  NSAIDs and Acetaminophen  -Non-pharmacologic: ice packs and sitz baths    Complications since 2 hours post delivery: None  Patient is tolerating activity well, denies dizziness. Voiding without difficulty, cramping is minimal and is relieved by hot pack, lochia is decreasing and patient denies clots.  Perineal pain is is minimal and is relieved by ice pack.  The perineum laceration is well approximated.     Postpartum hemoglobin   Hemoglobin   Date Value Ref Range Status   2019 11.8 11.7 - 15.7 g/dL Final      Prenatal Labs:   Lab Results   Component Value Date    ABO A 2019    RH Pos 2019    AS Neg 2019    HEPBANG Nonreactive 2018    TREPAB Negative 2013    RUQIGG 17 2018    HIV Negative 2013     Rubella: Immune  History of depression: Yes. Postpartum depression warning signs reviewed.    Physical Exam  Breasts: soft, nontender, filling  Nipples: intact, nontender  Fundus: firm, U, midline, nontender.   Abdomen: soft, nontender. 1 FB diastasis.   Lochia: rubra, scant  Perineum: well approximated  Legs: trace edema bilateral    ASSESSMENT/PLAN:  Normal postpartum exam, Stable Post-partum day #2  Complications:none  Plan d/c home today. Home Visit Ordered- Yes  RTC 2 weeks  Postpartum warning s/s reviewed, including bleeding/clots, fever, mastitis, or depression  Kegels/ crunches  Continue prenatal vitamins  Birthcontrol planned:Condoms    PROCEDURES:    2nd degree laceration repair    Current Discharge Medication List      CONTINUE these medications which have NOT CHANGED    Details   cholecalciferol (VITAMIN D3) 5000 units TABS tablet Take by mouth daily      fish oil-omega-3 fatty acids 1000 MG capsule Take 2 g by mouth daily      Prenatal MV-Min-Fe  Fum-FA-DHA (PRENATAL+DHA PO) Take 2 tablets by mouth daily           I, MARSHA Mitchell, am serving as a scribe to document services personally performed by CNM based on the provider's statements to me.- Namrata Wiseman     I agree with the note completed by the CNM Student, except for changes made by me. The encounter was performed by me and scribed by the student. The scribed note accurately reflects my personal services and decisions made by me.    Amaya DAUGHERTY CNM

## 2019-08-06 ENCOUNTER — DOCUMENTATION ONLY (OUTPATIENT)
Dept: OBGYN | Facility: CLINIC | Age: 34
End: 2019-08-06

## 2019-08-06 NOTE — PROGRESS NOTES
[facility] will be sharing updates with you on Maternal Child Health Referral requests for home care services.  This is for care coordination purposes and alert you to referral status.  We received the referral for  Tiana Soto; MRN [id number] and want to update you:      [facility] is unable to see patient for postpartum/ assessment and education due to patient insurance [employer] lfb not contracted with [facility] for this service. Patient advised to contact their insurance provider to determine if service is covered through another homecare agency.   Offered option of private pay nurse assessment and education for mom or baby at service rate of 150.00 per visit or 180.00 for both.  Provided call back information if private pay visit is requested.    Referral source IF STILL INPATIENT, ordering MD, and Primary Care Providers for mom and baby notified via [facility] ENCOUNTER OR CALL.      Sincerely [facility]  Peter Eid  [phone]

## 2019-08-16 ENCOUNTER — OFFICE VISIT (OUTPATIENT)
Dept: OBGYN | Facility: CLINIC | Age: 34
End: 2019-08-16
Payer: COMMERCIAL

## 2019-08-16 VITALS
HEART RATE: 103 BPM | DIASTOLIC BLOOD PRESSURE: 78 MMHG | SYSTOLIC BLOOD PRESSURE: 112 MMHG | WEIGHT: 180.4 LBS | BODY MASS INDEX: 30.02 KG/M2

## 2019-08-16 PROCEDURE — G0463 HOSPITAL OUTPT CLINIC VISIT: HCPCS | Mod: ZF

## 2019-08-16 ASSESSMENT — ANXIETY QUESTIONNAIRES
6. BECOMING EASILY ANNOYED OR IRRITABLE: NOT AT ALL
GAD7 TOTAL SCORE: 2
2. NOT BEING ABLE TO STOP OR CONTROL WORRYING: NOT AT ALL
3. WORRYING TOO MUCH ABOUT DIFFERENT THINGS: NOT AT ALL
1. FEELING NERVOUS, ANXIOUS, OR ON EDGE: SEVERAL DAYS
7. FEELING AFRAID AS IF SOMETHING AWFUL MIGHT HAPPEN: SEVERAL DAYS
5. BEING SO RESTLESS THAT IT IS HARD TO SIT STILL: NOT AT ALL

## 2019-08-16 ASSESSMENT — PATIENT HEALTH QUESTIONNAIRE - PHQ9
SUM OF ALL RESPONSES TO PHQ QUESTIONS 1-9: 1
5. POOR APPETITE OR OVEREATING: NOT AT ALL

## 2019-08-16 ASSESSMENT — PAIN SCALES - GENERAL: PAINLEVEL: NO PAIN (0)

## 2019-08-16 NOTE — PROGRESS NOTES
Happy w birth and care  Breasts , no pain, nipples intact  Feeding baby: exclusive breastfeeding, going well.  Sleep:well, 3-4 hr blocks, lots of support from carolyn  Voiding:, no concerns, no incontinence  Stooling: no concerns, no incontinence or constipation  Bleeding: min, no clots  Perineum:  perineal, has been walking a mile every other day  Adjusting:, happy w   PHQ-9 score:    PHQ-9 SCORE 2019   PHQ-9 Total Score -   PHQ-9 Total Score 1     Vitals: /78   Pulse 103   Wt 81.8 kg (180 lb 6.4 oz)   LMP 10/13/2018 (Exact Date)   BMI 30.02 kg/m    BMI= Body mass index is 30.02 kg/m .  Pregnancy and Birth complications:  LAbor and birth experience: short labor, no meds, 2nd lac. Used mouthguard, liked it  Birth control: condoms or paragard  Baby girl Qi Heck, CNM, APRN, DNP

## 2019-08-17 ASSESSMENT — ANXIETY QUESTIONNAIRES: GAD7 TOTAL SCORE: 2

## 2019-09-18 ENCOUNTER — OFFICE VISIT (OUTPATIENT)
Dept: OBGYN | Facility: CLINIC | Age: 34
End: 2019-09-18
Attending: ADVANCED PRACTICE MIDWIFE
Payer: COMMERCIAL

## 2019-09-18 VITALS
HEIGHT: 65 IN | WEIGHT: 181.1 LBS | HEART RATE: 64 BPM | DIASTOLIC BLOOD PRESSURE: 78 MMHG | SYSTOLIC BLOOD PRESSURE: 122 MMHG | BODY MASS INDEX: 30.17 KG/M2

## 2019-09-18 DIAGNOSIS — Z30.430 ENCOUNTER FOR INSERTION OF INTRAUTERINE CONTRACEPTIVE DEVICE: ICD-10-CM

## 2019-09-18 DIAGNOSIS — Z23 NEED FOR PROPHYLACTIC VACCINATION AND INOCULATION AGAINST INFLUENZA: ICD-10-CM

## 2019-09-18 DIAGNOSIS — Z30.430 ENCOUNTER FOR IUD INSERTION: ICD-10-CM

## 2019-09-18 PROBLEM — Z00.6 RESEARCH STUDY PATIENT: Status: RESOLVED | Noted: 2019-02-12 | Resolved: 2019-09-18

## 2019-09-18 PROBLEM — O36.8190 DECREASED FETAL MOVEMENT: Status: RESOLVED | Noted: 2019-07-30 | Resolved: 2019-09-18

## 2019-09-18 PROBLEM — Z34.03 ENCOUNTER FOR SUPERVISION OF NORMAL FIRST PREGNANCY IN THIRD TRIMESTER: Status: RESOLVED | Noted: 2018-12-11 | Resolved: 2019-09-18

## 2019-09-18 PROBLEM — Z36.89 ENCOUNTER FOR TRIAGE IN PREGNANT PATIENT: Status: RESOLVED | Noted: 2019-07-30 | Resolved: 2019-09-18

## 2019-09-18 PROCEDURE — 90686 IIV4 VACC NO PRSV 0.5 ML IM: CPT | Mod: ZF

## 2019-09-18 PROCEDURE — 58300 INSERT INTRAUTERINE DEVICE: CPT | Mod: ZF | Performed by: ADVANCED PRACTICE MIDWIFE

## 2019-09-18 PROCEDURE — 25000128 H RX IP 250 OP 636: Mod: ZF

## 2019-09-18 PROCEDURE — G0008 ADMIN INFLUENZA VIRUS VAC: HCPCS | Mod: ZF

## 2019-09-18 PROCEDURE — 25000125 ZZHC RX 250: Mod: ZF | Performed by: ADVANCED PRACTICE MIDWIFE

## 2019-09-18 PROCEDURE — G0463 HOSPITAL OUTPT CLINIC VISIT: HCPCS | Mod: 25

## 2019-09-18 RX ORDER — COPPER 313.4 MG/1
1 INTRAUTERINE DEVICE INTRAUTERINE ONCE
Status: COMPLETED
Start: 2019-09-18 | End: 2019-09-18

## 2019-09-18 RX ORDER — COPPER 313.4 MG/1
1 INTRAUTERINE DEVICE INTRAUTERINE ONCE
Qty: 1 EACH | Refills: 0
Start: 2019-09-18 | End: 2019-09-18

## 2019-09-18 RX ADMIN — COPPER 1 EACH: 313.4 INTRAUTERINE DEVICE INTRAUTERINE at 12:12

## 2019-09-18 ASSESSMENT — PATIENT HEALTH QUESTIONNAIRE - PHQ9
5. POOR APPETITE OR OVEREATING: NOT AT ALL
SUM OF ALL RESPONSES TO PHQ QUESTIONS 1-9: 4

## 2019-09-18 ASSESSMENT — ANXIETY QUESTIONNAIRES
2. NOT BEING ABLE TO STOP OR CONTROL WORRYING: NOT AT ALL
7. FEELING AFRAID AS IF SOMETHING AWFUL MIGHT HAPPEN: NOT AT ALL
1. FEELING NERVOUS, ANXIOUS, OR ON EDGE: SEVERAL DAYS
3. WORRYING TOO MUCH ABOUT DIFFERENT THINGS: SEVERAL DAYS
GAD7 TOTAL SCORE: 2
6. BECOMING EASILY ANNOYED OR IRRITABLE: NOT AT ALL
5. BEING SO RESTLESS THAT IT IS HARD TO SIT STILL: NOT AT ALL

## 2019-09-18 ASSESSMENT — PAIN SCALES - GENERAL: PAINLEVEL: NO PAIN (0)

## 2019-09-18 ASSESSMENT — MIFFLIN-ST. JEOR: SCORE: 1522.34
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
## 2019-09-19 ASSESSMENT — ANXIETY QUESTIONNAIRES: GAD7 TOTAL SCORE: 2

## 2019-10-15 ENCOUNTER — OFFICE VISIT (OUTPATIENT)
Dept: DERMATOLOGY | Facility: CLINIC | Age: 34
End: 2019-10-15
Payer: COMMERCIAL

## 2019-10-15 DIAGNOSIS — L91.8 ACROCHORDON: ICD-10-CM

## 2019-10-15 DIAGNOSIS — Q82.5 CONGENITAL NEVUS OF LOWER EXTREMITY: ICD-10-CM

## 2019-10-15 DIAGNOSIS — Z12.83 SKIN CANCER SCREENING: Primary | ICD-10-CM

## 2019-10-15 ASSESSMENT — PAIN SCALES - GENERAL: PAINLEVEL: NO PAIN (0)

## 2019-10-15 NOTE — NURSING NOTE
Dermatology Rooming Note    Tiana Soto's goals for this visit include:   Chief Complaint   Patient presents with     Skin Check     Skin check, Savana notes she has skin tags that grew during pregnancy and would like to make sure they are benign.     Roseanna Dave LPN

## 2019-10-15 NOTE — LETTER
10/15/2019       RE: Tiana Soto  [address] [facility] [address]     Dear Colleague,    Thank you for referring your patient, Taina Soto, to the [facility] at [facility]. Please see a copy of my visit note below.    CHIEF COMPLAINT:  Changing moles.      HISTORY OF PRESENT ILLNESS:  Tiana is a very pleasant, 34-year-old female previously seen in our clinic by Gia Araujo and Abhijeet Lovett in 2014; at this time she had a full body skin check, and they identified multiple benign-appearing pigmented nevi.  None of these were concerning on physical exam or dermoscopy at that time.  Today Tiana reports that she recently delivered her first child (who is now 11 weeks old, a girl) and that she noticed many changing and new moles during and immediately after her pregnancy.  Most of these are small bumps that appeared around her collar line, but she also has several small bumps on her neck and shoulders that she thinks may be new.  None of these have changed significantly over the past few weeks, and they are all effectively asymptomatic.  Her long-standing moles, including one on her right medial proximal thigh, all appear to be stable over time without significant changes or new symptoms.      REVIEW OF SYSTEMS:  No recent fevers or chills.  No nonhealing oral sores.      PHYSICAL EXAMINATION:   GENERAL:  This is a well-appearing, well-nourished female with a normal mood and affect who is oriented x3.   SKIN:  A cutaneous exam of the head, neck, chest, abdomen, back, bilateral upper and lower extremities and buttocks was performed in the presence of a female chaperone.  Circumferentially around the collar line, there are scattered, 1-2 mm, filiform, flesh-toned, soft papules consistent with acrochordons.  On the left posterolateral neck, there is a domed, 2 mm, bright reddish papule with uniform telangiectatic vessels and light-tan pigmentation on dermoscopy,  consistent with a benign nevus.  On the right chest, there is an oval, approximately 5 mm, pink, flat-topped, light-tan papule consistent with a nevus.  On the right proximal medial thigh, there is a 2 cm, flat-topped, light-brown to pink plaque with terminal hair growth, which appears similar to the description provided from her 2014 skin exam.  On the back, there are scattered, 2-4 mm, flesh-toned to light-tan, flat-topped papules consistent with benign nevi.      ASSESSMENT AND PLAN:   1.  Reassuring skin check.  We discussed today that it is common for moles to change during pregnancy, and also she likely has developed several skin tags related to pregnancy as well.  All these appear benign.  Reassurance was provided.   2.  Multiple acrochordons circumferentially around the neck.  Reassurance was provided as to the benign nature of these lesions.  If she desires treatment in the future, we could consider cryotherapy.   3.  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  This appears to be unchanged versus her last skin check.  Reassurance was provided.   4.  Multiple benign-appearing nevi.  We discussed the ABCDEs of melanoma and the signs and symptoms of nonmelanoma skin cancers.  She was reassured as to her benign skin exam today.  We also discussed photoprotection, high SPF physical sunscreens and photoprotective clothing, both for her and for her new baby.   5.  She will follow up in our clinic on an as-needed basis.       Alejandro Coronel MD  Dermatology Attending

## 2019-10-15 NOTE — PROGRESS NOTES
CHIEF COMPLAINT:  Changing moles.      HISTORY OF PRESENT ILLNESS:  Tiana is a very pleasant, 34-year-old female previously seen in our clinic by Gia Araujo and Abhijeet Lovett in 2014; at this time she had a full body skin check, and they identified multiple benign-appearing pigmented nevi.  None of these were concerning on physical exam or dermoscopy at that time.  Today Tiana reports that she recently delivered her first child (who is now 11 weeks old, a girl) and that she noticed many changing and new moles during and immediately after her pregnancy.  Most of these are small bumps that appeared around her collar line, but she also has several small bumps on her neck and shoulders that she thinks may be new.  None of these have changed significantly over the past few weeks, and they are all effectively asymptomatic.  Her long-standing moles, including one on her right medial proximal thigh, all appear to be stable over time without significant changes or new symptoms.      REVIEW OF SYSTEMS:  No recent fevers or chills.  No nonhealing oral sores.      PHYSICAL EXAMINATION:   GENERAL:  This is a well-appearing, well-nourished female with a normal mood and affect who is oriented x3.   SKIN:  A cutaneous exam of the head, neck, chest, abdomen, back, bilateral upper and lower extremities and buttocks was performed in the presence of a female chaperone.  Circumferentially around the collar line, there are scattered, 1-2 mm, filiform, flesh-toned, soft papules consistent with acrochordons.  On the left posterolateral neck, there is a domed, 2 mm, bright reddish papule with uniform telangiectatic vessels and light-tan pigmentation on dermoscopy, consistent with a benign nevus.  On the right chest, there is an oval, approximately 5 mm, pink, flat-topped, light-tan papule consistent with a nevus.  On the right proximal medial thigh, there is a 2 cm, flat-topped, light-brown to pink plaque with terminal hair growth,  which appears similar to the description provided from her 2014 skin exam.  On the back, there are scattered, 2-4 mm, flesh-toned to light-tan, flat-topped papules consistent with benign nevi.      ASSESSMENT AND PLAN:   1.  Reassuring skin check.  We discussed today that it is common for moles to change during pregnancy, and also she likely has developed several skin tags related to pregnancy as well.  All these appear benign.  Reassurance was provided.   2.  Multiple acrochordons circumferentially around the neck.  Reassurance was provided as to the benign nature of these lesions.  If she desires treatment in the future, we could consider cryotherapy.   3.  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  This appears to be unchanged versus her last skin check.  Reassurance was provided.   4.  Multiple benign-appearing nevi.  We discussed the ABCDEs of melanoma and the signs and symptoms of nonmelanoma skin cancers.  She was reassured as to her benign skin exam today.  We also discussed photoprotection, high SPF physical sunscreens and photoprotective clothing, both for her and for her new baby.   5.  She will follow up in our clinic on an as-needed basis.       Alejandro Coronel MD  Dermatology Attending

## 2019-10-17 PROBLEM — Q82.5 CONGENITAL NEVUS OF LOWER EXTREMITY: Status: ACTIVE | Noted: 2019-10-17

## 2019-10-17 PROBLEM — Z12.83 SKIN CANCER SCREENING: Status: ACTIVE | Noted: 2019-09-18

## 2019-10-17 PROBLEM — L91.8 ACROCHORDON: Status: ACTIVE | Noted: 2019-10-17

## 2019-11-08 ENCOUNTER — HEALTH MAINTENANCE LETTER (OUTPATIENT)
Age: 34
End: 2019-11-08

## 2020-02-23 ENCOUNTER — HEALTH MAINTENANCE LETTER (OUTPATIENT)
Age: 35
End: 2020-02-23

## 2020-07-06 ENCOUNTER — OFFICE VISIT (OUTPATIENT)
Dept: FAMILY MEDICINE | Facility: CLINIC | Age: 35
End: 2020-07-06
Payer: COMMERCIAL

## 2020-07-06 VITALS
DIASTOLIC BLOOD PRESSURE: 72 MMHG | BODY MASS INDEX: 28.96 KG/M2 | OXYGEN SATURATION: 95 % | TEMPERATURE: 98.2 F | HEIGHT: 66 IN | HEART RATE: 70 BPM | WEIGHT: 180.2 LBS | SYSTOLIC BLOOD PRESSURE: 109 MMHG

## 2020-07-06 DIAGNOSIS — Z00.00 ROUTINE GENERAL MEDICAL EXAMINATION AT A HEALTH CARE FACILITY: Primary | ICD-10-CM

## 2020-07-06 DIAGNOSIS — Z30.432 ENCOUNTER FOR IUD REMOVAL: ICD-10-CM

## 2020-07-06 PROCEDURE — 99395 PREV VISIT EST AGE 18-39: CPT | Mod: 25 | Performed by: FAMILY MEDICINE

## 2020-07-06 PROCEDURE — 58301 REMOVE INTRAUTERINE DEVICE: CPT | Performed by: FAMILY MEDICINE

## 2020-07-06 PROCEDURE — 87624 HPV HI-RISK TYP POOLED RSLT: CPT | Performed by: FAMILY MEDICINE

## 2020-07-06 PROCEDURE — G0145 SCR C/V CYTO,THINLAYER,RESCR: HCPCS | Performed by: FAMILY MEDICINE

## 2020-07-06 ASSESSMENT — ENCOUNTER SYMPTOMS
COUGH: 0
NAUSEA: 0
SORE THROAT: 0
CHILLS: 0
FEVER: 0
ARTHRALGIAS: 0
DIARRHEA: 0
WEAKNESS: 0
CONSTIPATION: 0
ABDOMINAL PAIN: 0
HEMATURIA: 0
MYALGIAS: 0
PALPITATIONS: 0
NERVOUS/ANXIOUS: 0
HEARTBURN: 0
PARESTHESIAS: 0
FREQUENCY: 0
HEADACHES: 0
JOINT SWELLING: 0
EYE PAIN: 0
DYSURIA: 0
DIZZINESS: 0
HEMATOCHEZIA: 0
BREAST MASS: 0
SHORTNESS OF BREATH: 0

## 2020-07-06 ASSESSMENT — MIFFLIN-ST. JEOR: SCORE: 1522.78

## 2020-07-06 NOTE — PROGRESS NOTES
SUBJECTIVE:   CC: Tiana Soto is an 35 year old woman who presents for preventive health visit.     Healthy Habits:     Getting at least 3 servings of Calcium per day:  Yes    Bi-annual eye exam:  NO    Dental care twice a year:  Yes    Sleep apnea or symptoms of sleep apnea:  None    Diet:  Regular (no restrictions) and Other    Frequency of exercise:  4-5 days/week    Duration of exercise:  15-30 minutes    Taking medications regularly:  Yes    Medication side effects:  Not applicable    PHQ-2 Total Score: 0    Additional concerns today:  No    She had a ParaGard IUD placed last year 9/2019. She would like the IUD removed today. She says her daughter's birthday is July 31 and they were going to start trying after she is a year old. She is still breastfeeding. Not on top of taking her prenatals as she used to be. Has some plantar warts that she treating herself.     Today's PHQ-2 Score:   PHQ-2 ( 1999 Pfizer) 7/6/2020   Q1: Little interest or pleasure in doing things 0   Q2: Feeling down, depressed or hopeless 0   PHQ-2 Score 0   Q1: Little interest or pleasure in doing things Not at all   Q2: Feeling down, depressed or hopeless Not at all   PHQ-2 Score 0       Abuse: Current or Past(Physical, Sexual or Emotional)- No  Do you feel safe in your environment? Yes      Social History     Tobacco Use     Smoking status: Former Smoker     Packs/day: 0.00     Years: 14.00     Pack years: 0.00     Types: Cigarettes     Start date: 7/1/2002     Last attempt to quit: 9/1/2016     Years since quitting: 3.8     Smokeless tobacco: Never Used     Tobacco comment: 1-2 cigarettes a day   Substance Use Topics     Alcohol use: No     Frequency: Never     Comment: stopped when found out pregnant     If you drink alcohol do you typically have >3 drinks per day or >7 drinks per week? No    Alcohol Use 7/6/2020   Prescreen: >3 drinks/day or >7 drinks/week? No   Prescreen: >3 drinks/day or >7 drinks/week? -   No flowsheet data  found.    Reviewed orders with patient.  Reviewed health maintenance and updated orders accordingly - Yes  BP Readings from Last 3 Encounters:   07/06/20 109/72   09/18/19 122/78   08/16/19 112/78    Wt Readings from Last 3 Encounters:   07/06/20 81.7 kg (180 lb 3.2 oz)   09/18/19 82.1 kg (181 lb 1.6 oz)   08/16/19 81.8 kg (180 lb 6.4 oz)                  Patient Active Problem List   Diagnosis     History of anxiety     Vitamin D deficiency     Skin cancer screening     Acrochordon     Congenital nevus of lower extremity     Past Surgical History:   Procedure Laterality Date     NO HISTORY OF SURGERY         Social History     Tobacco Use     Smoking status: Former Smoker     Packs/day: 0.00     Years: 14.00     Pack years: 0.00     Types: Cigarettes     Start date: 7/1/2002     Last attempt to quit: 9/1/2016     Years since quitting: 3.8     Smokeless tobacco: Never Used     Tobacco comment: 1-2 cigarettes a day   Substance Use Topics     Alcohol use: No     Frequency: Never     Comment: stopped when found out pregnant     Family History   Problem Relation Age of Onset     Depression Father      Cancer Other         uncle- esophageal cancer, aunt- leukemia     Diabetes No family hx of      Melanoma No family hx of          Current Outpatient Medications   Medication Sig Dispense Refill     cholecalciferol (VITAMIN D3) 5000 units TABS tablet Take by mouth daily       fish oil-omega-3 fatty acids 1000 MG capsule Take 2 g by mouth daily       Prenatal MV-Min-Fe Fum-FA-DHA (PRENATAL+DHA PO) Take 2 tablets by mouth daily         Mammogram not appropriate for this patient based on age.    Pertinent mammograms are reviewed under the imaging tab.  History of abnormal Pap smear:   NO - age 30-65 PAP every 5 years with negative HPV co-testing recommended  Last 3 Pap and HPV Results:   PAP / HPV Latest Ref Rng & Units 5/2/2016 7/16/2012   PAP - NIL NIL   HPV 16 DNA NEG Negative -   HPV 18 DNA NEG Negative -   OTHER HR HPV  NEG Negative -     PAP / HPV Latest Ref Rng & Units 2016   PAP - NIL NIL   HPV 16 DNA NEG Negative -   HPV 18 DNA NEG Negative -   OTHER HR HPV NEG Negative -     Reviewed and updated as needed this visit by clinical staff  Tobacco  Allergies  Meds         Reviewed and updated as needed this visit by Provider        Past Medical History:   Diagnosis Date     Depressive disorder     Treated for depression in adolescence, not since then.     Tobacco use disorder     Stopped prior to pregnancy      Past Surgical History:   Procedure Laterality Date     NO HISTORY OF SURGERY       OB History    Para Term  AB Living   1 1 1 0 0 1   SAB TAB Ectopic Multiple Live Births   0 0 0 0 1      # Outcome Date GA Lbr Javier/2nd Weight Sex Delivery Anes PTL Lv   1 Term 19 41w4d 05:08 / 02:09 3.51 kg (7 lb 11.8 oz) F Vag-Spont None N ALEXIS      Name: MADISON ANDRES      Apgar1: 8  Apgar5: 9       Review of Systems   Constitutional: Negative for chills and fever.   HENT: Negative for congestion, ear pain, hearing loss and sore throat.    Eyes: Negative for pain and visual disturbance.   Respiratory: Negative for cough and shortness of breath.    Cardiovascular: Negative for chest pain, palpitations and peripheral edema.   Gastrointestinal: Negative for abdominal pain, constipation, diarrhea, heartburn, hematochezia and nausea.   Breasts:  Negative for tenderness, breast mass and discharge.   Genitourinary: Negative for dysuria, frequency, genital sores, hematuria, pelvic pain, urgency, vaginal bleeding and vaginal discharge.   Musculoskeletal: Negative for arthralgias, joint swelling and myalgias.   Skin: Negative for rash.   Neurological: Negative for dizziness, weakness, headaches and paresthesias.   Psychiatric/Behavioral: Negative for mood changes. The patient is not nervous/anxious.       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 Hoping to attempt pregnancy now that her daughter is a year old.      OBJECTIVE:   /72 (BP Location: Right arm, Patient Position: Sitting, Cuff Size: Adult Regular)   Pulse 70   Temp 98.2  F (36.8  C) (Oral)   Ht 1.666 m (5' 5.6\")   Wt 81.7 kg (180 lb 3.2 oz)   LMP 06/28/2020 (Approximate)   SpO2 95%   BMI 29.44 kg/m    Physical Exam  GENERAL: healthy, alert and no distress  EYES: Eyes grossly normal to inspection, PERRL and conjunctivae and sclerae normal  HENT: ear canals and TM's normal, nose and mouth without ulcers or lesions  NECK: no adenopathy, no asymmetry, masses, or scars and thyroid normal to palpation  RESP: lungs clear to auscultation - no rales, rhonchi or wheezes  BREAST: normal without masses, tenderness or nipple discharge and no palpable axillary masses or adenopathy  CV: regular rate and rhythm, normal S1 S2, no S3 or S4, no murmur, click or rub, no peripheral edema and peripheral pulses strong  ABDOMEN: soft, nontender, no hepatosplenomegaly, no masses and bowel sounds normal   (female): normal female external genitalia, normal urethral meatus, vaginal mucosa pink, moist, well rugated, and normal cervix/adnexa/uterus without masses or discharge  MS: no gross musculoskeletal defects noted, no edema  SKIN: no suspicious lesions or rashes  NEURO: Normal strength and tone, mentation intact and speech normal  PSYCH: mentation appears normal, affect normal/bright    PROCEDURE:  IUD Removal  Type of IUD: Mirena    She is placed in a dorsal lithotomy potion and a pelvic exam is performed to determine the position of the uterus.  The cervix is identified and IUD strings clearly visible.  Ring forceps used to grasp the strings, and the IUD is gently and quickly removed.  No significant resistance or difficult noted. There were no complications. Patient tolerated the procedure well with no bleeding noted.   Findings: intact Mirena IUD     The patient tolerated this " "procedure without immediate complication.  The patient is to return or call immediately for any unexplained fever, abdominal or pelvic pain, excessive bleeding, possibility of pregnancy, foul-smelling discharge, although the risk is low.     Diagnostic Test Results:  Labs reviewed in Epic    ASSESSMENT/PLAN:       ICD-10-CM    1. Routine general medical examination at a health care facility  Z00.00 Pap imaged thin layer screen with HPV - recommended age 30 - 65     HPV High Risk Types DNA Cervical   2. Encounter for IUD removal  Z30.432 REMOVE INTRAUTERINE DEVICE       COUNSELING:  Reviewed preventive health counseling, as reflected in patient instructions  Plans on attempting pregnancy soon. Continues her prenatals  Estimated body mass index is 29.44 kg/m  as calculated from the following:    Height as of this encounter: 1.666 m (5' 5.6\").    Weight as of this encounter: 81.7 kg (180 lb 3.2 oz).    Weight management plan: Discussed healthy diet and exercise guidelines     reports that she quit smoking about 3 years ago. Her smoking use included cigarettes. She started smoking about 18 years ago. She smoked 0.00 packs per day for 14.00 years. She has never used smokeless tobacco.      Counseling Resources:  ATP IV Guidelines  Pooled Cohorts Equation Calculator  Breast Cancer Risk Calculator  FRAX Risk Assessment  ICSI Preventive Guidelines  Dietary Guidelines for Americans, 2010  USDA's MyPlate  ASA Prophylaxis  Lung CA Screening    Maura Benavides MD   [facility]  "

## 2020-07-08 LAB
COPATH REPORT: NORMAL
PAP: NORMAL

## 2020-07-09 LAB
FINAL DIAGNOSIS: NORMAL
HPV HR 12 DNA CVX QL NAA+PROBE: NEGATIVE
HPV16 DNA SPEC QL NAA+PROBE: NEGATIVE
HPV18 DNA SPEC QL NAA+PROBE: NEGATIVE
SPECIMEN DESCRIPTION: NORMAL
SPECIMEN SOURCE CVX/VAG CYTO: NORMAL

## 2020-12-06 ENCOUNTER — HEALTH MAINTENANCE LETTER (OUTPATIENT)
Age: 35
End: 2020-12-06

## 2021-01-22 ENCOUNTER — E-VISIT (OUTPATIENT)
Dept: FAMILY MEDICINE | Facility: CLINIC | Age: 36
End: 2021-01-22
Payer: COMMERCIAL

## 2021-01-22 DIAGNOSIS — R10.9 ABDOMINAL PAIN, UNSPECIFIED ABDOMINAL LOCATION: Primary | ICD-10-CM

## 2021-01-22 PROCEDURE — 99207 PR NON-BILLABLE SERV PER CHARTING: CPT | Performed by: FAMILY MEDICINE

## 2021-01-25 NOTE — PATIENT INSTRUCTIONS
Thank you for choosing us for your care. I think a telephone or video visit would be best next steps based on your symptoms. Please schedule a clinic appointment; you won t be charged for this eVisit.      You can schedule an appointment right here in [facility], or call [phone]

## 2021-07-19 ENCOUNTER — E-VISIT (OUTPATIENT)
Dept: FAMILY MEDICINE | Facility: CLINIC | Age: 36
End: 2021-07-19
Payer: COMMERCIAL

## 2021-07-19 DIAGNOSIS — J06.9 UPPER RESPIRATORY TRACT INFECTION, UNSPECIFIED TYPE: Primary | ICD-10-CM

## 2021-07-19 PROCEDURE — 99207 PR NON-BILLABLE SERV PER CHARTING: CPT | Performed by: FAMILY MEDICINE

## 2021-09-26 ENCOUNTER — HEALTH MAINTENANCE LETTER (OUTPATIENT)
Age: 36
End: 2021-09-26

## 2022-02-02 ENCOUNTER — MYC MEDICAL ADVICE (OUTPATIENT)
Dept: FAMILY MEDICINE | Facility: CLINIC | Age: 37
End: 2022-02-02
Payer: COMMERCIAL

## 2022-02-02 DIAGNOSIS — Z31.41 ENCOUNTER FOR FERTILITY TESTING: Primary | ICD-10-CM

## 2022-02-02 NOTE — TELEPHONE ENCOUNTER
Dr. Benavides-  1. Patient requesting fertility testing after trying to conceive for ove a year, and it is writer's understanding Primary Care does not provide this type of evaluation.  2. Referral pended.    Thank you!  JERAD FallN, RN  [facility]

## 2022-02-03 NOTE — TELEPHONE ENCOUNTER
That is correct. I signed the referral. Please let Savana know. Thanks! Maura Benavides M.D.

## 2022-02-03 NOTE — TELEPHONE ENCOUNTER
Writer responded via [employer].    JERAD FallN, RN  [facility]

## 2022-02-22 ENCOUNTER — OFFICE VISIT (OUTPATIENT)
Dept: OBGYN | Facility: CLINIC | Age: 37
End: 2022-02-22
Attending: FAMILY MEDICINE
Payer: COMMERCIAL

## 2022-02-22 VITALS
HEIGHT: 65 IN | BODY MASS INDEX: 34.87 KG/M2 | HEART RATE: 60 BPM | WEIGHT: 209.3 LBS | DIASTOLIC BLOOD PRESSURE: 77 MMHG | SYSTOLIC BLOOD PRESSURE: 112 MMHG

## 2022-02-22 DIAGNOSIS — Z31.69 ENCOUNTER FOR PRECONCEPTION CONSULTATION: Primary | ICD-10-CM

## 2022-02-22 DIAGNOSIS — Z31.41 ENCOUNTER FOR FERTILITY TESTING: ICD-10-CM

## 2022-02-22 PROCEDURE — G0463 HOSPITAL OUTPT CLINIC VISIT: HCPCS

## 2022-02-22 PROCEDURE — 99213 OFFICE O/P EST LOW 20 MIN: CPT | Performed by: OBSTETRICS & GYNECOLOGY

## 2022-02-22 NOTE — LETTER
2022       RE: Tiana Soto  [address] [place] [address]     Dear Colleague,    Thank you for referring your patient, Tiana Soto, to the [facility] [place] [facility]. Please see a copy of my visit note below.    Savana is a 25 yo  here with  Her male partner to discuss achieving pregnancy They have been open to pregnancy for 18 months and doing timed intercourse for the last 4-6 months. Reviewed No condoms, occasional lube, intravaginal ejaculation.    She staes they are not interested in extreme measures but are looking for advice.    Menses: menache 11x 28-31x4 , molinima symptoms.  Copper IUD removed 2020    POB 19     PGYN: no abnl pap  PMH:  None  PSH: none  Meds: PNV  Male factor: fathered one child, boxers, no genital trauma or mumps, no difficulty with ejaculation.  Discussed timed IC with cycle vs LH predictor kit vs regular frequent intercourse.    Doubt tubal factor, male factor or couple factor given recent success.    A/P pre-conception counseling    Technically meet criteria for infertility based on age and time trying. At this point would recommend timed intercourse using either   - Calender day 14 and day 16 with no ejaculation after day 11  -LH predictor kit have sex the night the first morning urine turns positive and the next night    Call to clinic to schedule consult for Clomid with either Dr. Jean, Dr. Christiano Carter or Dr. Bocanegra.   Total time 25 min in counseling and coordinating care.    Tiana Antunez MD

## 2022-02-22 NOTE — PROGRESS NOTES
Savana is a 27 yo  here with  Her male partner to discuss achieving pregnancy They have been open to pregnancy for 18 months and doing timed intercourse for the last 4-6 months. Reviewed No condoms, occasional lube, intravaginal ejaculation.    She staes they are not interested in extreme measures but ar looking for advice.    Menses: menache 11x 28-31x4 , molinima symptoms.  Copper IUD removed 2020    POB 19     PGYN: no abnl pap  PMH:  None  PSH: none  Meds: PNV  Male factor: fathered one child, boxers, no genital trauma or mumps, no difficulty with ejaculation.  Discussed timed IC with cycle vs LH predictor kit vs regular frequent intercourse.    Doubt tubal factor, male factor or couple factor given recent success.    A/P pre-conception counseling    Technically meet criteria for infertility based on age and time trying. At this point would recommend timed intercourse using either   - Calender day 14 and day 16 with no ejaculation after day 11  -LH predictor kit have sex the night the first morning urine turns positive and the next night    Call to clinic to schedule consult for Clomid with either Dr. Jean, Dr. Christiano Carter or Dr. Bocanegra.   Total time 25 min in counseling and coordinating care.    Tiana Antunez MD

## 2022-03-02 NOTE — PATIENT INSTRUCTIONS
Preventive Health Recommendations  Female Ages 26 - 39  Yearly exam:   See your health care provider every year in order to    Review health changes.     Discuss preventive care.      Review your medicines if you your doctor has prescribed any.    Until age 30: Get a Pap test every three years (more often if you have had an abnormal result).    After age 30: Talk to your doctor about whether you should have a Pap test every 3 years or have a Pap test with HPV screening every 5 years.   You do not need a Pap test if your uterus was removed (hysterectomy) and you have not had cancer.  You should be tested each year for STDs (sexually transmitted diseases), if you're at risk.   Talk to your provider about how often to have your cholesterol checked.  If you are at risk for diabetes, you should have a diabetes test (fasting glucose).  Shots: Get a flu shot each year. Get a tetanus shot every 10 years.   Nutrition:     Eat at least 5 servings of fruits and vegetables each day.    Eat whole-grain bread, whole-wheat pasta and brown rice instead of white grains and rice.    Get adequate Calcium and Vitamin D.     Lifestyle    Exercise at least 150 minutes a week (30 minutes a day, 5 days of the week). This will help you control your weight and prevent disease.    Limit alcohol to one drink per day.    No smoking.     Wear sunscreen to prevent skin cancer.    See your dentist every six months for an exam and cleaning.     46 yo male presenting with his mother Eladia for evaluation for gastroparesis referred by Maggy Rowland NP. A copy of this note will be sent to the referring physician -reports being diagnosed with gastroparesis- states that he presented to [facility] ? for nausea/vomiting- had endoscopy and possible GES. told that his stomach was very delayed at [facility].  he got his gallbladder removed at [facility].  no records available- possibly during the summer -has cut out a lot of the food triggers for this- high fiber, high fat foods -generally a litle improvement.  however states that he had an episode of severe nausea, vomiting -he has vomiting/inability to keep po during these bouts -he takes phenergan  25 mg as needed, but basically daily and omeprazole 20 mg otc a day.   -eating toast, mashed potatos, can due tuna fish -tends to have a lot of constipation- goes about 10-15 times a day . states that this has been his baseline for 2-3 years. denies any blood in the stool -reports colonoscpoy -denies any fh of any itnesitnal issues -also reports pain in his back extending up to the front of his abdomen ======================================================================================= 3/2/2022 [facility] records obtained-  last seen in 3/2021- states he didn't follow up because he was hoping that things would improve but they did not -gallbladder was removed during the summer but there was no imrpovement -currently his worst symptoms that are the vomiting and diarrhea -he has never had any counseling on food intake with gastroparesis -eating toasts, soup.  applesauce.  chicken and fish he feels that the bloating  is on a daily basis , all over the abdomen, sometimes in the upper abdomen -states that there is no specific abdominal pain -feels that there is a flipping of his stomach/intestines that happen daily -has 10-15  bm , they are never formed

## 2022-09-06 ENCOUNTER — VIRTUAL VISIT (OUTPATIENT)
Dept: FAMILY MEDICINE | Facility: CLINIC | Age: 37
End: 2022-09-06
Payer: COMMERCIAL

## 2022-09-06 DIAGNOSIS — R21 RASH: Primary | ICD-10-CM

## 2022-09-06 PROCEDURE — 99212 OFFICE O/P EST SF 10 MIN: CPT | Mod: GT | Performed by: FAMILY MEDICINE

## 2022-09-06 NOTE — PROGRESS NOTES
"JILL is a 37 year old who is being evaluated via a billable video visit.      How would you like to obtain your AVS? MyChart  If the video visit is dropped, the invitation should be resent by: Text to cell phone: [phone]  Will anyone else be joining your video visit? No          Assessment & Plan     Rash -resolved  Suspect insect bite, risk of tick bite seems to be low due to the fact that it was on her biceps, where she would be more likely to see a tick within 36 hours.  If it were attached and was not in a particularly high risk area where tick bites when she noticed the spot.  She denies any symptoms suggesting early disseminated Lyme disease, but will contact me if she does develop concerning symptoms such as fatigue, muscle aches, swollen glands, malaise.       BMI:   Estimated body mass index is 34.83 kg/m  as calculated from the following:    Height as of 2/22/22: 1.651 m (5' 5\").    Weight as of 2/22/22: 94.9 kg (209 lb 4.8 oz).           No follow-ups on file.    Maura Benavides MD  [facility]    Subjective   JILL is a 37 year old , presenting for the following health issues:  No chief complaint on file.      HPI     Concern - insect bite  Onset: 8/4/2022  Description:notice when it was puffy and notice a lump shape and right hand biceps  Intensity: moderate  Progression of Symptoms:  improving  Accompanying Signs & Symptoms: No  Previous history of similar problem: No  Precipitating factors:        Worsened by: putting sunscreen or lotion  Alleviating factors:        Improved by: None, Just showering and keeping it cool  Therapies tried and outcome: None    She does not recall having an insect bite and never saw a tick on the area of her biceps that she noticed the rash.  Suspected spider or insect bite, but wanted to make sure he did not sound concerning for Lyme disease.  Denies malaise, fatigue, myalgias, fever.  Denies swollen glands.  Otherwise healthy and doing " well.  Rash resolved    Attempting pregnancy right now.       Review of Systems          Objective           Vitals:  No vitals were obtained today due to virtual visit.    Physical Exam   GENERAL: Healthy, alert and no distress  EYES: Eyes grossly normal to inspection.  No discharge or erythema, or obvious scleral/conjunctival abnormalities.  RESP: No audible wheeze, cough, or visible cyanosis.  No visible retractions or increased work of breathing.    SKIN: Visible skin clear. No significant rash, abnormal pigmentation or lesions.  NEURO: Cranial nerves grossly intact.  Mentation and speech appropriate for age.  PSYCH: Mentation appears normal, affect normal/bright, judgement and insight intact, normal speech and appearance well-groomed.                  Video-Visit Details    Video Start Time: 12:20    Type of service:  Video Visit    Video End Time:12:29 PM    Originating Location (pt. Location): Home    Distant Location (provider location):  [facility]     Platform used for Video Visit: Shelly    [unfilled]  [unfilled]

## 2023-04-22 ENCOUNTER — HEALTH MAINTENANCE LETTER (OUTPATIENT)
Age: 38
End: 2023-04-22

## 2023-08-17 ENCOUNTER — MYC MEDICAL ADVICE (OUTPATIENT)
Dept: FAMILY MEDICINE | Facility: CLINIC | Age: 38
End: 2023-08-17
Payer: COMMERCIAL

## 2023-08-17 NOTE — TELEPHONE ENCOUNTER
Reception: could you reach out and help pt schedule?      Hi Dr. Benavides, I've been trying to schedule an annual visit with you and you don't pop up as one of the available providers. Is there a way you can help me with this?     Thank you!    Aurelia AGUIRRE RN  [facility]

## 2023-10-17 ENCOUNTER — OFFICE VISIT (OUTPATIENT)
Dept: URGENT CARE | Facility: URGENT CARE | Age: 38
End: 2023-10-17
Payer: COMMERCIAL

## 2023-10-17 VITALS — DIASTOLIC BLOOD PRESSURE: 75 MMHG | OXYGEN SATURATION: 97 % | SYSTOLIC BLOOD PRESSURE: 116 MMHG | HEART RATE: 64 BPM

## 2023-10-17 DIAGNOSIS — J02.9 VIRAL PHARYNGITIS: Primary | ICD-10-CM

## 2023-10-17 DIAGNOSIS — R07.0 THROAT PAIN: ICD-10-CM

## 2023-10-17 LAB
DEPRECATED S PYO AG THROAT QL EIA: NEGATIVE
GROUP A STREP BY PCR: NOT DETECTED

## 2023-10-17 PROCEDURE — 87651 STREP A DNA AMP PROBE: CPT | Performed by: PHYSICIAN ASSISTANT

## 2023-10-17 PROCEDURE — 99213 OFFICE O/P EST LOW 20 MIN: CPT | Performed by: PHYSICIAN ASSISTANT

## 2023-10-17 NOTE — PROGRESS NOTES
Assessment/Plan:    Strep negative. No sign of retropharyngeal or peritonsillar abscess. Afebrile and in no acute distress. Suspect viral etiology. Continue supportive cares- use of ibuprofen, acetaminophen, Chloraseptic throat spray, throat lozenges as needed.   See patient instructions below.    At the end of the encounter, I discussed results, diagnosis, medications. Discussed red flags for immediate return to clinic/ER, as well as indications for follow up if no improvement. Patient understood and agreed to plan. Patient was stable for discharge.      ICD-10-CM    1. Viral pharyngitis  J02.9       2. Throat pain  R07.0 Streptococcus A Rapid Screen w/Reflex to PCR - Clinic Collect     Group A Streptococcus PCR Throat Swab            Return in about 2 weeks (around 10/31/2023) for Follow up w/ primary care provider if not better.    KOSTAS Nunes, BRIGHT  [facility]  -----------------------------------------------------------------------------------------------------------------------------------------------------    HPI:  Tiana Soto is a 38 year old female who presents for evaluation of sore throat onset last night. No treatments tried. Patient reports no fever/chills, cough, congestion, headache, chest pain, shortness of breath, abdominal pain, nausea, vomiting, diarrhea, rash, or any other symptoms.     Past Medical History:   Diagnosis Date    Depressive disorder 1996    Treated for depression in adolescence, not since then.    Tobacco use disorder     Stopped prior to pregnancy       Vitals:    10/17/23 1044   BP: 116/75   BP Location: Right arm   Patient Position: Sitting   Cuff Size: Adult Regular   Pulse: 64   SpO2: 97%       Physical Exam  Vitals and nursing note reviewed.   HENT:      Mouth/Throat:      Mouth: Mucous membranes are moist.      Pharynx: Uvula midline. Posterior oropharyngeal erythema present.      Tonsils: No tonsillar exudate or tonsillar abscesses.    Pulmonary:      Effort: Pulmonary effort is normal.   Neurological:      Mental Status: She is alert.         Labs/Imaging:  Results for orders placed or performed in visit on 10/17/23 (from the past 24 hour(s))   Streptococcus A Rapid Screen w/Reflex to PCR - Clinic Collect    Specimen: Throat; Swab   Result Value Ref Range    Group A Strep antigen Negative Negative     No results found for this or any previous visit (from the past 24 hour(s)).        Patient Instructions   You or your child have pharyngitis (sore throat). This infection is caused by a virus. It can cause throat pain that is worse when swallowing, aching all over, headache, and fever. The infection may be spread by coughing, kissing, or touching others after touching your mouth or nose. Antibiotic medicines do not work against viruses. They are not used for treating this illness.    Relieving your symptoms  Drink plenty of fluids to prevent dehydration.  Don t smoke, and avoid secondhand smoke.  For children, try throat sprays or Popsicles. Adults and older children may try lozenges.  Drink warm liquids to soothe the throat and help thin mucus. Avoid alcohol, spicy foods, salty foods, and acidic drinks such as orange juice. These can irritate the throat.  Gargle with warm saltwater (1 teaspoon of salt to 8 ounces of warm water).  Use a humidifier to keep air moist and relieve throat dryness.  Try over-the-counter pain relievers such as acetaminophen or ibuprofen. Use as directed, and don t exceed the recommended dose. Don t give aspirin to children.   Take Tylenol 650mg every 4 hours or ibuprofen 600mg every 6 hours by mouth for pain/fever.  Do not exceed 4000mg of acetaminophen or 2400mg of ibuprofen from any source in a 24 hour period.  Taking Tylenol and ibuprofen together may be helpful in reducing pain. If you have chronic liver or kidney disease or ever had a stomach ulcer or GI bleeding, talk with your healthcare provider before using  these medicines.   Are antibiotics needed?  Most sore throats are caused by cold or flu viruses. And antibiotics don t treat viral illness. In fact, using antibiotics when they re not needed may produce bacteria that are harder to kill. Your healthcare provider will prescribe antibiotics only if he or she thinks they are likely to help.  Is surgery needed?  In some cases, tonsils need to be removed. This is often done as outpatient (same-day) surgery. Your healthcare provider may advise removing the tonsils in cases of:  Several severe bouts of tonsillitis in a year.  Severe  episodes include those that lead to missed days of school or work, or that need to be treated with antibiotics.  Tonsillitis that causes breathing problems during sleep  Tonsillitis caused by food particles collecting in pouches in the tonsils (cryptic tonsillitis)  Call your healthcare provider if any of the following occur:  Symptoms worsen, or new symptoms develop.  Swollen tonsils make breathing difficult.  Painful lumps in the back of neck  Stiff neck  Lymph nodes are getting larger  Can t swallow liquids, a lot of drooling, or can t open mouth wide due to throat pain  Signs of dehydration, such as very dark urine or no urine, sunken eyes, dizziness  Trouble breathing or noisy breathing  Muffled voice  A skin rash, hives, or wheezing develops. Any of these could signal an allergic reaction to antibiotics.  Symptoms don t improve within a week.

## 2023-10-17 NOTE — PATIENT INSTRUCTIONS
You or your child have pharyngitis (sore throat). This infection is caused by a virus. It can cause throat pain that is worse when swallowing, aching all over, headache, and fever. The infection may be spread by coughing, kissing, or touching others after touching your mouth or nose. Antibiotic medicines do not work against viruses. They are not used for treating this illness.    Relieving your symptoms  Drink plenty of fluids to prevent dehydration.  Don t smoke, and avoid secondhand smoke.  For children, try throat sprays or Popsicles. Adults and older children may try lozenges.  Drink warm liquids to soothe the throat and help thin mucus. Avoid alcohol, spicy foods, salty foods, and acidic drinks such as orange juice. These can irritate the throat.  Gargle with warm saltwater (1 teaspoon of salt to 8 ounces of warm water).  Use a humidifier to keep air moist and relieve throat dryness.  Try over-the-counter pain relievers such as acetaminophen or ibuprofen. Use as directed, and don t exceed the recommended dose. Don t give aspirin to children.   Take Tylenol 650mg every 4 hours or ibuprofen 600mg every 6 hours by mouth for pain/fever.  Do not exceed 4000mg of acetaminophen or 2400mg of ibuprofen from any source in a 24 hour period.  Taking Tylenol and ibuprofen together may be helpful in reducing pain. If you have chronic liver or kidney disease or ever had a stomach ulcer or GI bleeding, talk with your healthcare provider before using these medicines.   Are antibiotics needed?  Most sore throats are caused by cold or flu viruses. And antibiotics don t treat viral illness. In fact, using antibiotics when they re not needed may produce bacteria that are harder to kill. Your healthcare provider will prescribe antibiotics only if he or she thinks they are likely to help.  Is surgery needed?  In some cases, tonsils need to be removed. This is often done as outpatient (same-day) surgery. Your healthcare provider may  advise removing the tonsils in cases of:  Several severe bouts of tonsillitis in a year.  Severe  episodes include those that lead to missed days of school or work, or that need to be treated with antibiotics.  Tonsillitis that causes breathing problems during sleep  Tonsillitis caused by food particles collecting in pouches in the tonsils (cryptic tonsillitis)  Call your healthcare provider if any of the following occur:  Symptoms worsen, or new symptoms develop.  Swollen tonsils make breathing difficult.  Painful lumps in the back of neck  Stiff neck  Lymph nodes are getting larger  Can t swallow liquids, a lot of drooling, or can t open mouth wide due to throat pain  Signs of dehydration, such as very dark urine or no urine, sunken eyes, dizziness  Trouble breathing or noisy breathing  Muffled voice  A skin rash, hives, or wheezing develops. Any of these could signal an allergic reaction to antibiotics.  Symptoms don t improve within a week.

## 2023-11-07 ENCOUNTER — OFFICE VISIT (OUTPATIENT)
Dept: FAMILY MEDICINE | Facility: CLINIC | Age: 38
End: 2023-11-07
Payer: COMMERCIAL

## 2023-11-07 VITALS
WEIGHT: 203.9 LBS | TEMPERATURE: 97.3 F | DIASTOLIC BLOOD PRESSURE: 79 MMHG | SYSTOLIC BLOOD PRESSURE: 115 MMHG | BODY MASS INDEX: 32.77 KG/M2 | RESPIRATION RATE: 16 BRPM | HEIGHT: 66 IN | OXYGEN SATURATION: 99 % | HEART RATE: 69 BPM

## 2023-11-07 DIAGNOSIS — Z00.00 ROUTINE GENERAL MEDICAL EXAMINATION AT A HEALTH CARE FACILITY: Primary | ICD-10-CM

## 2023-11-07 DIAGNOSIS — Z13.1 SCREENING FOR DIABETES MELLITUS: ICD-10-CM

## 2023-11-07 DIAGNOSIS — Z13.220 LIPID SCREENING: ICD-10-CM

## 2023-11-07 PROCEDURE — 90480 ADMN SARSCOV2 VAC 1/ONLY CMP: CPT | Performed by: FAMILY MEDICINE

## 2023-11-07 PROCEDURE — 99395 PREV VISIT EST AGE 18-39: CPT | Mod: 25 | Performed by: FAMILY MEDICINE

## 2023-11-07 PROCEDURE — 91320 SARSCV2 VAC 30MCG TRS-SUC IM: CPT | Performed by: FAMILY MEDICINE

## 2023-11-07 PROCEDURE — 90471 IMMUNIZATION ADMIN: CPT | Performed by: FAMILY MEDICINE

## 2023-11-07 PROCEDURE — 90632 HEPA VACCINE ADULT IM: CPT | Performed by: FAMILY MEDICINE

## 2023-11-07 ASSESSMENT — ENCOUNTER SYMPTOMS
SORE THROAT: 0
CONSTIPATION: 0
PALPITATIONS: 0
PARESTHESIAS: 0
EYE PAIN: 0
FEVER: 0
BREAST MASS: 0
SHORTNESS OF BREATH: 0
JOINT SWELLING: 0
DYSURIA: 0
HEADACHES: 0
HEMATURIA: 0
HEMATOCHEZIA: 0
CHILLS: 0
WEAKNESS: 0
MYALGIAS: 0
FREQUENCY: 0
DIZZINESS: 0
NAUSEA: 0
NERVOUS/ANXIOUS: 0
DIARRHEA: 0
ARTHRALGIAS: 0
COUGH: 0
HEARTBURN: 0
ABDOMINAL PAIN: 0

## 2023-11-07 ASSESSMENT — PAIN SCALES - GENERAL: PAINLEVEL: NO PAIN (0)

## 2023-11-07 NOTE — NURSING NOTE
Prior to immunization administration, verified patients identity using patient s name and date of birth. Please see Immunization Activity for additional information.     Screening Questionnaire for Adult Immunization    Are you sick today?   No   Do you have allergies to medications, food, a vaccine component or latex?   No   Have you ever had a serious reaction after receiving a vaccination?   No   Do you have a long-term health problem with heart, lung, kidney, or metabolic disease (e.g., diabetes), asthma, a blood disorder, no spleen, complement component deficiency, a cochlear implant, or a spinal fluid leak?  Are you on long-term aspirin therapy?   No   Do you have cancer, leukemia, HIV/AIDS, or any other immune system problem?   No   Do you have a parent, brother, or sister with an immune system problem?   No   In the past 3 months, have you taken medications that affect  your immune system, such as prednisone, other steroids, or anticancer drugs; drugs for the treatment of rheumatoid arthritis, Crohn s disease, or psoriasis; or have you had radiation treatments?   No   Have you had a seizure, or a brain or other nervous system problem?   No   During the past year, have you received a transfusion of blood or blood    products, or been given immune (gamma) globulin or antiviral drug?   No   For women: Are you pregnant or is there a chance you could become       pregnant during the next month?   No   Have you received any vaccinations in the past 4 weeks?   Yes     Immunization questionnaire was positive for at least one answer.  Notified Dr. Benavides.      Patient instructed to remain in clinic for 15 minutes afterwards, and to report any adverse reactions.     Screening performed by Crista Govea RN on 11/7/2023 at 10:18 AM.

## 2023-11-07 NOTE — PROGRESS NOTES
SUBJECTIVE:   CC: JILL is an 38 year old who presents for preventive health visit.       11/7/2023     9:34 AM   Additional Questions   Roomed by Jone Frost   Accompanied by Self       Healthy Habits:     Getting at least 3 servings of Calcium per day:  Yes    Bi-annual eye exam:  NO    Dental care twice a year:  Yes    Sleep apnea or symptoms of sleep apnea:  None    Diet:  Regular (no restrictions)    Frequency of exercise:  4-5 days/week    Duration of exercise:  30-45 minutes    Taking medications regularly:  Not Applicable    Medication side effects:  Not applicable    Additional concerns today:  No    Yeasty smell under her breasts sometimes, but nothing vaginally. No rash.     In couples therapy and he just scheduled a vasectomy. They may not be having sex with each other, but she may want to have other partners in the future and would use a condom.     Feeling good. Feels like she is getting to know herself better this year.   Daughter is 4 and she recently stopped breastfeeding. 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     Today's PHQ-2 Score:       11/7/2023     9:22 AM   PHQ-2 ( 1999 [employer])   Q1: Little interest or pleasure in doing things 0   Q2: Feeling down, depressed or hopeless 0   PHQ-2 Score 0   Q1: Little interest or pleasure in doing things Not at all   Q2: Feeling down, depressed or hopeless Not at all   PHQ-2 Score 0       Have you ever done Advance Care Planning? (For example, a Health Directive, POLST, or a discussion with a medical provider or your loved ones about your wishes): No, advance care planning information given to patient to review.  Advanced care planning was discussed at today's visit.    Social History     Tobacco Use    Smoking status: Former     Packs/day: 0.00     Years: 14.00     Additional pack years: 0.00     Total pack years: 0.00     Types: Cigarettes     Start date: 7/1/2002     Quit  date: 2016     Years since quittin.1    Smokeless tobacco: Never    Tobacco comments:     1-2 cigarettes a day   Substance Use Topics    Alcohol use: No     Comment: stopped when found out pregnant             2023     9:22 AM   Alcohol Use   Prescreen: >3 drinks/day or >7 drinks/week? No     Reviewed orders with patient.  Reviewed health maintenance and updated orders accordingly - Yes       Breast Cancer Screenin/7/2023     9:23 AM   Breast CA Risk Assessment (FHS-7)   Do you have a family history of breast, colon, or ovarian cancer? No / Unknown        Pertinent mammograms are reviewed under the imaging tab.    History of abnormal Pap smear: Last 3 Pap and HPV Results:       Latest Ref Rng & Units 2020     3:38 PM 2016    11:08 AM 2016    12:00 AM   PAP / HPV   PAP (Historical)  NIL   NIL    HPV 16 DNA NEG^Negative Negative  Negative     HPV 18 DNA NEG^Negative Negative  Negative     Other HR HPV NEG^Negative Negative  Negative           Latest Ref Rng & Units 2020     3:38 PM 2016    11:08 AM 2016    12:00 AM   PAP / HPV   PAP (Historical)  NIL   NIL    HPV 16 DNA NEG^Negative Negative  Negative     HPV 18 DNA NEG^Negative Negative  Negative     Other HR HPV NEG^Negative Negative  Negative       Reviewed and updated as needed this visit by clinical staff   Tobacco  Allergies  Meds              Reviewed and updated as needed this visit by Provider                 Past Medical History:   Diagnosis Date    Depressive disorder     Treated for depression in adolescence, not since then.    Tobacco use disorder     Stopped prior to pregnancy      Past Surgical History:   Procedure Laterality Date    NO HISTORY OF SURGERY       OB History    Para Term  AB Living   1 1 1 0 0 1   SAB IAB Ectopic Multiple Live Births   0 0 0 0 1      # Outcome Date GA Lbr Javier/2nd Weight Sex Delivery Anes PTL Lv   1 Term 19 41w4d 05:08 / 02:09 3.51 kg (7 lb 11.8 oz)  "F Vag-Spont None N ALEXIS      Name: GAGANDEEP ANDRES-SALBADOR      Apgar1: 8  Apgar5: 9       Review of Systems   Constitutional:  Negative for chills and fever.   HENT:  Negative for congestion, ear pain, hearing loss and sore throat.    Eyes:  Negative for pain and visual disturbance.   Respiratory:  Negative for cough and shortness of breath.    Cardiovascular:  Negative for chest pain, palpitations and peripheral edema.   Gastrointestinal:  Negative for abdominal pain, constipation, diarrhea, heartburn, hematochezia and nausea.   Breasts:  Negative for tenderness, breast mass and discharge.   Genitourinary:  Negative for dysuria, frequency, genital sores, hematuria, pelvic pain, urgency, vaginal bleeding and vaginal discharge.   Musculoskeletal:  Negative for arthralgias, joint swelling and myalgias.   Skin:  Negative for rash.   Neurological:  Negative for dizziness, weakness, headaches and paresthesias.   Psychiatric/Behavioral:  Negative for mood changes. The patient is not nervous/anxious.            OBJECTIVE:   /79 (BP Location: Right arm, Patient Position: Sitting, Cuff Size: Adult Large)   Pulse 69   Temp 97.3  F (36.3  C) (Temporal)   Resp 16   Ht 1.674 m (5' 5.9\")   Wt 92.5 kg (203 lb 14.4 oz)   LMP 10/11/2023 (Approximate)   SpO2 99%   Breastfeeding No   BMI 33.01 kg/m      Wt Readings from Last 5 Encounters:   11/07/23 92.5 kg (203 lb 14.4 oz)   02/22/22 94.9 kg (209 lb 4.8 oz)   07/06/20 81.7 kg (180 lb 3.2 oz)   09/18/19 82.1 kg (181 lb 1.6 oz)   08/16/19 81.8 kg (180 lb 6.4 oz)      Physical Exam  GENERAL: healthy, alert and no distress  EYES: Eyes grossly normal to inspection, PERRL and conjunctivae and sclerae normal  HENT: ear canals and TM's normal, nose and mouth without ulcers or lesions  NECK: no adenopathy, no asymmetry, masses, or scars and thyroid normal to palpation  RESP: lungs clear to auscultation - no rales, rhonchi or wheezes  CV: regular rate and rhythm, normal S1 S2, no S3 " "or S4, no murmur, click or rub, no peripheral edema and peripheral pulses strong  ABDOMEN: soft, nontender, no hepatosplenomegaly, no masses and bowel sounds normal  MS: no gross musculoskeletal defects noted, no edema  SKIN: no suspicious lesions or rashes  NEURO: Normal strength and tone, mentation intact and speech normal  PSYCH: mentation appears normal, affect normal/bright    Diagnostic Test Results:  Labs reviewed in Epic    ASSESSMENT/PLAN:       ICD-10-CM    1. Routine general medical examination at a health care facility  Z00.00       2. Lipid screening  Z13.220 Lipid panel reflex to direct LDL Fasting      3. Screening for diabetes mellitus  Z13.1 Glucose         Routine general medical examination at a health care facility  (primary encounter diagnosis)     Healthy  COVID  vaccine today. She had flu shot at her child's   She is hep B immune.   Second dose of hep A vaccine given today.   Birth control: not currently needed and she plans to use condoms if it is   Pap with HPV testing normal in 2020. Due for repeat in 2025.     Patient has been advised of split billing requirements and indicates understanding: Yes      COUNSELING:  Reviewed preventive health counseling, as reflected in patient instructions      BMI:   Estimated body mass index is 33.01 kg/m  as calculated from the following:    Height as of this encounter: 1.674 m (5' 5.9\").    Weight as of this encounter: 92.5 kg (203 lb 14.4 oz).   Weight management plan: diet and exercise      She reports that she quit smoking about 7 years ago. Her smoking use included cigarettes. She started smoking about 21 years ago. She has never used smokeless tobacco.          Maura Benavides MD, MD  [facility]  Answers submitted by the patient for this visit:  Annual Preventive Visit (Submitted on 11/7/2023)  Chief Complaint: Annual Exam:  Frequency of exercise:: 4-5 days/week  Getting at least 3 servings of Calcium per day:: " Yes  Diet:: Regular (no restrictions)  Taking medications regularly:: Not Applicable  Medication side effects:: Not applicable  Bi-annual eye exam:: NO  Dental care twice a year:: Yes  Sleep apnea or symptoms of sleep apnea:: None  abdominal pain: No  Blood in stool: No  Blood in urine: No  chest pain: No  chills: No  congestion: No  constipation: No  cough: No  diarrhea: No  dizziness: No  ear pain: No  eye pain: No  nervous/anxious: No  fever: No  frequency: No  genital sores: No  headaches: No  hearing loss: No  heartburn: No  arthralgias: No  joint swelling: No  peripheral edema: No  mood changes: No  myalgias: No  nausea: No  dysuria: No  palpitations: No  Skin sensation changes: No  sore throat: No  urgency: No  rash: No  shortness of breath: No  visual disturbance: No  weakness: No  pelvic pain: No  vaginal bleeding: No  vaginal discharge: No  tenderness: No  breast mass: No  breast discharge: No  Additional concerns today:: No  Exercise outside of work (Submitted on 11/7/2023)  Chief Complaint: Annual Exam:  Duration of exercise:: 30-45 minutes

## 2023-11-09 ENCOUNTER — MYC MEDICAL ADVICE (OUTPATIENT)
Dept: FAMILY MEDICINE | Facility: CLINIC | Age: 38
End: 2023-11-09
Payer: COMMERCIAL

## 2023-11-09 NOTE — TELEPHONE ENCOUNTER
Writer responded via [employer].    Janki Rossi, JERADN RN  [facility]

## 2024-08-09 ENCOUNTER — MYC MEDICAL ADVICE (OUTPATIENT)
Dept: FAMILY MEDICINE | Facility: CLINIC | Age: 39
End: 2024-08-09

## 2024-08-13 NOTE — TELEPHONE ENCOUNTER
Writer called patient who stated:  Symptoms now resolved and usually last 2 days      Writer recommended patient be evaluated if symptoms recur-is welcome to call clinic to speak with a nurse/schedule an appointment or be evaluated in Urgent Care.    Patient verbalized understanding and in agreement with plan.    JERAD FallN, RN-[facility]

## 2024-11-18 ENCOUNTER — TRANSCRIBE ORDERS (OUTPATIENT)
Dept: OTHER | Age: 39
End: 2024-11-18

## 2024-11-18 DIAGNOSIS — Z00.00 ROUTINE PHYSICAL EXAMINATION: Primary | ICD-10-CM

## 2025-01-05 ENCOUNTER — HEALTH MAINTENANCE LETTER (OUTPATIENT)
Age: 40
End: 2025-01-05

## 2025-06-01 ENCOUNTER — HEALTH MAINTENANCE LETTER (OUTPATIENT)
Age: 40
End: 2025-06-01